# Patient Record
Sex: MALE | Race: WHITE | NOT HISPANIC OR LATINO | Employment: FULL TIME | URBAN - METROPOLITAN AREA
[De-identification: names, ages, dates, MRNs, and addresses within clinical notes are randomized per-mention and may not be internally consistent; named-entity substitution may affect disease eponyms.]

---

## 2017-05-04 ENCOUNTER — RECORDS - HEALTHEAST (OUTPATIENT)
Dept: LAB | Facility: CLINIC | Age: 47
End: 2017-05-04

## 2017-05-04 LAB
CHOLEST SERPL-MCNC: 175 MG/DL
FASTING STATUS PATIENT QL REPORTED: ABNORMAL
HDLC SERPL-MCNC: 47 MG/DL
LDLC SERPL CALC-MCNC: 85 MG/DL
TRIGL SERPL-MCNC: 213 MG/DL

## 2019-05-06 ENCOUNTER — RECORDS - HEALTHEAST (OUTPATIENT)
Dept: LAB | Facility: CLINIC | Age: 49
End: 2019-05-06

## 2019-05-06 LAB
ALBUMIN SERPL-MCNC: 4.1 G/DL (ref 3.5–5)
ALP SERPL-CCNC: 66 U/L (ref 45–120)
ALT SERPL W P-5'-P-CCNC: 38 U/L (ref 0–45)
ANION GAP SERPL CALCULATED.3IONS-SCNC: 13 MMOL/L (ref 5–18)
AST SERPL W P-5'-P-CCNC: 19 U/L (ref 0–40)
BASOPHILS # BLD AUTO: 0 THOU/UL (ref 0–0.2)
BASOPHILS NFR BLD AUTO: 0 % (ref 0–2)
BILIRUB SERPL-MCNC: 0.8 MG/DL (ref 0–1)
BUN SERPL-MCNC: 15 MG/DL (ref 8–22)
CALCIUM SERPL-MCNC: 9.8 MG/DL (ref 8.5–10.5)
CHLORIDE BLD-SCNC: 103 MMOL/L (ref 98–107)
CHOLEST SERPL-MCNC: 164 MG/DL
CO2 SERPL-SCNC: 22 MMOL/L (ref 22–31)
CREAT SERPL-MCNC: 0.84 MG/DL (ref 0.7–1.3)
EOSINOPHIL # BLD AUTO: 0.1 THOU/UL (ref 0–0.4)
EOSINOPHIL NFR BLD AUTO: 2 % (ref 0–6)
ERYTHROCYTE [DISTWIDTH] IN BLOOD BY AUTOMATED COUNT: 13.4 % (ref 11–14.5)
FASTING STATUS PATIENT QL REPORTED: ABNORMAL
GFR SERPL CREATININE-BSD FRML MDRD: >60 ML/MIN/1.73M2
GLUCOSE BLD-MCNC: 149 MG/DL (ref 70–125)
HCT VFR BLD AUTO: 37.9 % (ref 40–54)
HDLC SERPL-MCNC: 44 MG/DL
HGB BLD-MCNC: 12.7 G/DL (ref 14–18)
LDLC SERPL CALC-MCNC: 82 MG/DL
LYMPHOCYTES # BLD AUTO: 1.8 THOU/UL (ref 0.8–4.4)
LYMPHOCYTES NFR BLD AUTO: 38 % (ref 20–40)
MCH RBC QN AUTO: 29.7 PG (ref 27–34)
MCHC RBC AUTO-ENTMCNC: 33.5 G/DL (ref 32–36)
MCV RBC AUTO: 89 FL (ref 80–100)
MONOCYTES # BLD AUTO: 0.4 THOU/UL (ref 0–0.9)
MONOCYTES NFR BLD AUTO: 7 % (ref 2–10)
NEUTROPHILS # BLD AUTO: 2.5 THOU/UL (ref 2–7.7)
NEUTROPHILS NFR BLD AUTO: 52 % (ref 50–70)
PLATELET # BLD AUTO: 185 THOU/UL (ref 140–440)
PMV BLD AUTO: 11.1 FL (ref 8.5–12.5)
POTASSIUM BLD-SCNC: 4.4 MMOL/L (ref 3.5–5)
PROT SERPL-MCNC: 6.9 G/DL (ref 6–8)
RBC # BLD AUTO: 4.28 MILL/UL (ref 4.4–6.2)
SODIUM SERPL-SCNC: 138 MMOL/L (ref 136–145)
TRIGL SERPL-MCNC: 189 MG/DL
WBC: 4.8 THOU/UL (ref 4–11)

## 2019-05-09 LAB
SHBG SERPL-SCNC: 17 NMOL/L (ref 11–80)
TESTOST FREE SERPL-MCNC: 7.91 NG/DL (ref 4.7–24.4)
TESTOST SERPL-MCNC: 291 NG/DL (ref 240–950)

## 2020-03-24 ENCOUNTER — RECORDS - HEALTHEAST (OUTPATIENT)
Dept: LAB | Facility: CLINIC | Age: 50
End: 2020-03-24

## 2020-03-24 LAB
ALBUMIN SERPL-MCNC: 4 G/DL (ref 3.5–5)
ALP SERPL-CCNC: 66 U/L (ref 45–120)
ALT SERPL W P-5'-P-CCNC: 25 U/L (ref 0–45)
ANION GAP SERPL CALCULATED.3IONS-SCNC: 8 MMOL/L (ref 5–18)
AST SERPL W P-5'-P-CCNC: 15 U/L (ref 0–40)
BILIRUB SERPL-MCNC: 0.7 MG/DL (ref 0–1)
BUN SERPL-MCNC: 12 MG/DL (ref 8–22)
CALCIUM SERPL-MCNC: 9.8 MG/DL (ref 8.5–10.5)
CHLORIDE BLD-SCNC: 102 MMOL/L (ref 98–107)
CHOLEST SERPL-MCNC: 157 MG/DL
CO2 SERPL-SCNC: 30 MMOL/L (ref 22–31)
CREAT SERPL-MCNC: 0.9 MG/DL (ref 0.7–1.3)
FASTING STATUS PATIENT QL REPORTED: ABNORMAL
GFR SERPL CREATININE-BSD FRML MDRD: >60 ML/MIN/1.73M2
GLUCOSE BLD-MCNC: 138 MG/DL (ref 70–125)
HDLC SERPL-MCNC: 44 MG/DL
LDLC SERPL CALC-MCNC: 58 MG/DL
POTASSIUM BLD-SCNC: 4.4 MMOL/L (ref 3.5–5)
PROT SERPL-MCNC: 6.9 G/DL (ref 6–8)
SODIUM SERPL-SCNC: 140 MMOL/L (ref 136–145)
TRIGL SERPL-MCNC: 273 MG/DL

## 2020-05-07 ENCOUNTER — RECORDS - HEALTHEAST (OUTPATIENT)
Dept: LAB | Facility: CLINIC | Age: 50
End: 2020-05-07

## 2020-05-07 LAB
C REACTIVE PROTEIN LHE: 0.4 MG/DL (ref 0–0.8)
URATE SERPL-MCNC: 4.5 MG/DL (ref 3–8)

## 2021-03-05 ENCOUNTER — RECORDS - HEALTHEAST (OUTPATIENT)
Dept: LAB | Facility: CLINIC | Age: 51
End: 2021-03-05

## 2021-03-05 LAB
ALBUMIN SERPL-MCNC: 4 G/DL (ref 3.5–5)
ALP SERPL-CCNC: 69 U/L (ref 45–120)
ALT SERPL W P-5'-P-CCNC: 31 U/L (ref 0–45)
ANION GAP SERPL CALCULATED.3IONS-SCNC: 9 MMOL/L (ref 5–18)
AST SERPL W P-5'-P-CCNC: 19 U/L (ref 0–40)
BILIRUB SERPL-MCNC: 0.8 MG/DL (ref 0–1)
BUN SERPL-MCNC: 15 MG/DL (ref 8–22)
CALCIUM SERPL-MCNC: 9 MG/DL (ref 8.5–10.5)
CHLORIDE BLD-SCNC: 99 MMOL/L (ref 98–107)
CHOLEST SERPL-MCNC: 146 MG/DL
CO2 SERPL-SCNC: 26 MMOL/L (ref 22–31)
CREAT SERPL-MCNC: 0.96 MG/DL (ref 0.7–1.3)
FASTING STATUS PATIENT QL REPORTED: ABNORMAL
GFR SERPL CREATININE-BSD FRML MDRD: >60 ML/MIN/1.73M2
GLUCOSE BLD-MCNC: 173 MG/DL (ref 70–125)
HDLC SERPL-MCNC: 50 MG/DL
LDLC SERPL CALC-MCNC: 40 MG/DL
POTASSIUM BLD-SCNC: 4.1 MMOL/L (ref 3.5–5)
PROT SERPL-MCNC: 6.8 G/DL (ref 6–8)
SODIUM SERPL-SCNC: 134 MMOL/L (ref 136–145)
TRIGL SERPL-MCNC: 282 MG/DL

## 2021-05-27 ENCOUNTER — RECORDS - HEALTHEAST (OUTPATIENT)
Dept: ADMINISTRATIVE | Facility: CLINIC | Age: 51
End: 2021-05-27

## 2021-05-28 ENCOUNTER — RECORDS - HEALTHEAST (OUTPATIENT)
Dept: ADMINISTRATIVE | Facility: CLINIC | Age: 51
End: 2021-05-28

## 2021-06-01 ENCOUNTER — RECORDS - HEALTHEAST (OUTPATIENT)
Dept: ADMINISTRATIVE | Facility: CLINIC | Age: 51
End: 2021-06-01

## 2021-07-28 ENCOUNTER — LAB REQUISITION (OUTPATIENT)
Dept: LAB | Facility: CLINIC | Age: 51
End: 2021-07-28

## 2021-07-28 DIAGNOSIS — R10.12 LEFT UPPER QUADRANT PAIN: ICD-10-CM

## 2021-07-28 LAB — LIPASE SERPL-CCNC: 35 U/L (ref 0–52)

## 2021-07-28 PROCEDURE — 83690 ASSAY OF LIPASE: CPT | Performed by: NURSE PRACTITIONER

## 2022-02-18 ENCOUNTER — LAB REQUISITION (OUTPATIENT)
Dept: LAB | Facility: CLINIC | Age: 52
End: 2022-02-18

## 2022-02-18 DIAGNOSIS — I10 ESSENTIAL (PRIMARY) HYPERTENSION: ICD-10-CM

## 2022-02-18 DIAGNOSIS — E78.5 HYPERLIPIDEMIA, UNSPECIFIED: ICD-10-CM

## 2022-02-18 LAB
ALBUMIN SERPL-MCNC: 4.3 G/DL (ref 3.5–5)
ALP SERPL-CCNC: 59 U/L (ref 45–120)
ALT SERPL W P-5'-P-CCNC: 41 U/L (ref 0–45)
ANION GAP SERPL CALCULATED.3IONS-SCNC: 8 MMOL/L (ref 5–18)
AST SERPL W P-5'-P-CCNC: 24 U/L (ref 0–40)
BILIRUB SERPL-MCNC: 1.1 MG/DL (ref 0–1)
BUN SERPL-MCNC: 10 MG/DL (ref 8–22)
CALCIUM SERPL-MCNC: 9.6 MG/DL (ref 8.5–10.5)
CHLORIDE BLD-SCNC: 102 MMOL/L (ref 98–107)
CHOLEST SERPL-MCNC: 163 MG/DL
CO2 SERPL-SCNC: 27 MMOL/L (ref 22–31)
CREAT SERPL-MCNC: 0.96 MG/DL (ref 0.7–1.3)
FASTING STATUS PATIENT QL REPORTED: ABNORMAL
GFR SERPL CREATININE-BSD FRML MDRD: >90 ML/MIN/1.73M2
GLUCOSE BLD-MCNC: 160 MG/DL (ref 70–125)
HDLC SERPL-MCNC: 51 MG/DL
LDLC SERPL CALC-MCNC: 69 MG/DL
POTASSIUM BLD-SCNC: 3.7 MMOL/L (ref 3.5–5)
PROT SERPL-MCNC: 7.2 G/DL (ref 6–8)
SODIUM SERPL-SCNC: 137 MMOL/L (ref 136–145)
TRIGL SERPL-MCNC: 214 MG/DL

## 2022-02-18 PROCEDURE — 80061 LIPID PANEL: CPT | Performed by: FAMILY MEDICINE

## 2022-02-18 PROCEDURE — 80053 COMPREHEN METABOLIC PANEL: CPT | Performed by: FAMILY MEDICINE

## 2022-06-08 ENCOUNTER — TRANSFERRED RECORDS (OUTPATIENT)
Dept: HEALTH INFORMATION MANAGEMENT | Facility: CLINIC | Age: 52
End: 2022-06-08

## 2022-08-10 ENCOUNTER — TRANSFERRED RECORDS (OUTPATIENT)
Dept: HEALTH INFORMATION MANAGEMENT | Facility: CLINIC | Age: 52
End: 2022-08-10

## 2022-08-15 ENCOUNTER — LAB REQUISITION (OUTPATIENT)
Dept: LAB | Facility: CLINIC | Age: 52
End: 2022-08-15

## 2022-08-15 DIAGNOSIS — Z00.00 ENCOUNTER FOR GENERAL ADULT MEDICAL EXAMINATION WITHOUT ABNORMAL FINDINGS: ICD-10-CM

## 2022-08-15 DIAGNOSIS — E78.5 HYPERLIPIDEMIA, UNSPECIFIED: ICD-10-CM

## 2022-08-15 DIAGNOSIS — E11.65 TYPE 2 DIABETES MELLITUS WITH HYPERGLYCEMIA (H): ICD-10-CM

## 2022-08-15 DIAGNOSIS — I10 ESSENTIAL (PRIMARY) HYPERTENSION: ICD-10-CM

## 2022-08-15 LAB
ALBUMIN SERPL BCG-MCNC: 4.5 G/DL (ref 3.5–5.2)
ALP SERPL-CCNC: 59 U/L (ref 40–129)
ALT SERPL W P-5'-P-CCNC: 30 U/L (ref 10–50)
ANION GAP SERPL CALCULATED.3IONS-SCNC: 12 MMOL/L (ref 7–15)
AST SERPL W P-5'-P-CCNC: 25 U/L (ref 10–50)
BILIRUB SERPL-MCNC: 0.6 MG/DL
BUN SERPL-MCNC: 8.7 MG/DL (ref 6–20)
CALCIUM SERPL-MCNC: 9.4 MG/DL (ref 8.6–10)
CHLORIDE SERPL-SCNC: 97 MMOL/L (ref 98–107)
CHOLEST SERPL-MCNC: 161 MG/DL
CREAT SERPL-MCNC: 0.95 MG/DL (ref 0.67–1.17)
CREAT UR-MCNC: 39.1 MG/DL
DEPRECATED HCO3 PLAS-SCNC: 28 MMOL/L (ref 22–29)
GFR SERPL CREATININE-BSD FRML MDRD: >90 ML/MIN/1.73M2
GLUCOSE SERPL-MCNC: 136 MG/DL (ref 70–99)
HDLC SERPL-MCNC: 51 MG/DL
LDLC SERPL CALC-MCNC: 72 MG/DL
MICROALBUMIN UR-MCNC: <12 MG/L
MICROALBUMIN/CREAT UR: NORMAL MG/G{CREAT}
NONHDLC SERPL-MCNC: 110 MG/DL
POTASSIUM SERPL-SCNC: 3.9 MMOL/L (ref 3.4–5.3)
PROT SERPL-MCNC: 6.6 G/DL (ref 6.4–8.3)
PSA SERPL-MCNC: 0.89 NG/ML (ref 0–3.5)
SODIUM SERPL-SCNC: 137 MMOL/L (ref 136–145)
TRIGL SERPL-MCNC: 189 MG/DL

## 2022-08-15 PROCEDURE — 80061 LIPID PANEL: CPT | Performed by: STUDENT IN AN ORGANIZED HEALTH CARE EDUCATION/TRAINING PROGRAM

## 2022-08-15 PROCEDURE — 82043 UR ALBUMIN QUANTITATIVE: CPT | Performed by: STUDENT IN AN ORGANIZED HEALTH CARE EDUCATION/TRAINING PROGRAM

## 2022-08-15 PROCEDURE — G0103 PSA SCREENING: HCPCS | Performed by: STUDENT IN AN ORGANIZED HEALTH CARE EDUCATION/TRAINING PROGRAM

## 2022-08-15 PROCEDURE — 80053 COMPREHEN METABOLIC PANEL: CPT | Performed by: STUDENT IN AN ORGANIZED HEALTH CARE EDUCATION/TRAINING PROGRAM

## 2022-08-23 ENCOUNTER — TRANSFERRED RECORDS (OUTPATIENT)
Dept: HEALTH INFORMATION MANAGEMENT | Facility: CLINIC | Age: 52
End: 2022-08-23

## 2022-09-23 ENCOUNTER — TRANSFERRED RECORDS (OUTPATIENT)
Dept: HEALTH INFORMATION MANAGEMENT | Facility: CLINIC | Age: 52
End: 2022-09-23

## 2023-02-06 ENCOUNTER — TRANSFERRED RECORDS (OUTPATIENT)
Dept: HEALTH INFORMATION MANAGEMENT | Facility: CLINIC | Age: 53
End: 2023-02-06

## 2023-03-17 ENCOUNTER — LAB REQUISITION (OUTPATIENT)
Dept: LAB | Facility: CLINIC | Age: 53
End: 2023-03-17

## 2023-03-17 DIAGNOSIS — I10 ESSENTIAL (PRIMARY) HYPERTENSION: ICD-10-CM

## 2023-03-17 DIAGNOSIS — E11.42 TYPE 2 DIABETES MELLITUS WITH DIABETIC POLYNEUROPATHY (H): ICD-10-CM

## 2023-03-17 DIAGNOSIS — E78.5 HYPERLIPIDEMIA, UNSPECIFIED: ICD-10-CM

## 2023-03-17 LAB
CHOLEST SERPL-MCNC: 142 MG/DL
HDLC SERPL-MCNC: 41 MG/DL
LDLC SERPL CALC-MCNC: 58 MG/DL
NONHDLC SERPL-MCNC: 101 MG/DL
TRIGL SERPL-MCNC: 213 MG/DL

## 2023-03-17 PROCEDURE — 80061 LIPID PANEL: CPT | Performed by: STUDENT IN AN ORGANIZED HEALTH CARE EDUCATION/TRAINING PROGRAM

## 2023-03-17 PROCEDURE — 80053 COMPREHEN METABOLIC PANEL: CPT | Performed by: STUDENT IN AN ORGANIZED HEALTH CARE EDUCATION/TRAINING PROGRAM

## 2023-03-18 LAB
ALBUMIN SERPL BCG-MCNC: 4.4 G/DL (ref 3.5–5.2)
ALP SERPL-CCNC: 58 U/L (ref 40–129)
ALT SERPL W P-5'-P-CCNC: 23 U/L (ref 10–50)
ANION GAP SERPL CALCULATED.3IONS-SCNC: 11 MMOL/L (ref 7–15)
AST SERPL W P-5'-P-CCNC: 17 U/L (ref 10–50)
BILIRUB SERPL-MCNC: 0.6 MG/DL
BUN SERPL-MCNC: 17 MG/DL (ref 6–20)
CALCIUM SERPL-MCNC: 9.6 MG/DL (ref 8.6–10)
CHLORIDE SERPL-SCNC: 100 MMOL/L (ref 98–107)
CREAT SERPL-MCNC: 0.91 MG/DL (ref 0.67–1.17)
DEPRECATED HCO3 PLAS-SCNC: 28 MMOL/L (ref 22–29)
GFR SERPL CREATININE-BSD FRML MDRD: >90 ML/MIN/1.73M2
GLUCOSE SERPL-MCNC: 120 MG/DL (ref 70–99)
POTASSIUM SERPL-SCNC: 4.4 MMOL/L (ref 3.4–5.3)
PROT SERPL-MCNC: 6.9 G/DL (ref 6.4–8.3)
SODIUM SERPL-SCNC: 139 MMOL/L (ref 136–145)

## 2023-03-22 ENCOUNTER — TRANSFERRED RECORDS (OUTPATIENT)
Dept: HEALTH INFORMATION MANAGEMENT | Facility: CLINIC | Age: 53
End: 2023-03-22

## 2023-04-14 ENCOUNTER — LAB REQUISITION (OUTPATIENT)
Dept: LAB | Facility: CLINIC | Age: 53
End: 2023-04-14

## 2023-04-14 DIAGNOSIS — Z01.818 ENCOUNTER FOR OTHER PREPROCEDURAL EXAMINATION: ICD-10-CM

## 2023-04-14 LAB
ANION GAP SERPL CALCULATED.3IONS-SCNC: 17 MMOL/L (ref 7–15)
BUN SERPL-MCNC: 16.9 MG/DL (ref 6–20)
CALCIUM SERPL-MCNC: 10 MG/DL (ref 8.6–10)
CHLORIDE SERPL-SCNC: 101 MMOL/L (ref 98–107)
CREAT SERPL-MCNC: 1.25 MG/DL (ref 0.67–1.17)
DEPRECATED HCO3 PLAS-SCNC: 25 MMOL/L (ref 22–29)
GFR SERPL CREATININE-BSD FRML MDRD: 68 ML/MIN/1.73M2
GLUCOSE SERPL-MCNC: 115 MG/DL (ref 70–99)
POTASSIUM SERPL-SCNC: 4 MMOL/L (ref 3.4–5.3)
SODIUM SERPL-SCNC: 143 MMOL/L (ref 136–145)

## 2023-04-14 PROCEDURE — 80048 BASIC METABOLIC PNL TOTAL CA: CPT | Performed by: STUDENT IN AN ORGANIZED HEALTH CARE EDUCATION/TRAINING PROGRAM

## 2023-04-25 ENCOUNTER — TRANSFERRED RECORDS (OUTPATIENT)
Dept: HEALTH INFORMATION MANAGEMENT | Facility: CLINIC | Age: 53
End: 2023-04-25
Payer: COMMERCIAL

## 2023-05-04 ENCOUNTER — TRANSFERRED RECORDS (OUTPATIENT)
Dept: HEALTH INFORMATION MANAGEMENT | Facility: CLINIC | Age: 53
End: 2023-05-04
Payer: COMMERCIAL

## 2023-05-10 ENCOUNTER — TRANSFERRED RECORDS (OUTPATIENT)
Dept: HEALTH INFORMATION MANAGEMENT | Facility: CLINIC | Age: 53
End: 2023-05-10
Payer: COMMERCIAL

## 2023-09-01 ENCOUNTER — LAB REQUISITION (OUTPATIENT)
Dept: LAB | Facility: CLINIC | Age: 53
End: 2023-09-01

## 2023-09-01 DIAGNOSIS — E78.5 HYPERLIPIDEMIA, UNSPECIFIED: ICD-10-CM

## 2023-09-01 DIAGNOSIS — I10 ESSENTIAL (PRIMARY) HYPERTENSION: ICD-10-CM

## 2023-09-01 DIAGNOSIS — Z00.00 ENCOUNTER FOR GENERAL ADULT MEDICAL EXAMINATION WITHOUT ABNORMAL FINDINGS: ICD-10-CM

## 2023-09-01 DIAGNOSIS — E11.42 TYPE 2 DIABETES MELLITUS WITH DIABETIC POLYNEUROPATHY (H): ICD-10-CM

## 2023-09-01 LAB
ALBUMIN SERPL BCG-MCNC: 4.7 G/DL (ref 3.5–5.2)
ALP SERPL-CCNC: 62 U/L (ref 40–129)
ALT SERPL W P-5'-P-CCNC: 25 U/L (ref 0–70)
ANION GAP SERPL CALCULATED.3IONS-SCNC: 12 MMOL/L (ref 7–15)
AST SERPL W P-5'-P-CCNC: 18 U/L (ref 0–45)
BILIRUB SERPL-MCNC: 0.5 MG/DL
BUN SERPL-MCNC: 16.4 MG/DL (ref 6–20)
CALCIUM SERPL-MCNC: 9.3 MG/DL (ref 8.6–10)
CHLORIDE SERPL-SCNC: 100 MMOL/L (ref 98–107)
CHOLEST SERPL-MCNC: 159 MG/DL
CREAT SERPL-MCNC: 0.93 MG/DL (ref 0.67–1.17)
DEPRECATED HCO3 PLAS-SCNC: 28 MMOL/L (ref 22–29)
GFR SERPL CREATININE-BSD FRML MDRD: >90 ML/MIN/1.73M2
GLUCOSE SERPL-MCNC: 81 MG/DL (ref 70–99)
HDLC SERPL-MCNC: 54 MG/DL
LDLC SERPL CALC-MCNC: 64 MG/DL
NONHDLC SERPL-MCNC: 105 MG/DL
POTASSIUM SERPL-SCNC: 4 MMOL/L (ref 3.4–5.3)
PROT SERPL-MCNC: 6.9 G/DL (ref 6.4–8.3)
PSA SERPL DL<=0.01 NG/ML-MCNC: 1 NG/ML (ref 0–3.5)
SODIUM SERPL-SCNC: 140 MMOL/L (ref 136–145)
TRIGL SERPL-MCNC: 203 MG/DL

## 2023-09-01 PROCEDURE — 80061 LIPID PANEL: CPT | Performed by: STUDENT IN AN ORGANIZED HEALTH CARE EDUCATION/TRAINING PROGRAM

## 2023-09-01 PROCEDURE — 80053 COMPREHEN METABOLIC PANEL: CPT | Performed by: STUDENT IN AN ORGANIZED HEALTH CARE EDUCATION/TRAINING PROGRAM

## 2023-09-01 PROCEDURE — G0103 PSA SCREENING: HCPCS | Performed by: STUDENT IN AN ORGANIZED HEALTH CARE EDUCATION/TRAINING PROGRAM

## 2024-02-11 ENCOUNTER — APPOINTMENT (OUTPATIENT)
Dept: RADIOLOGY | Facility: CLINIC | Age: 54
DRG: 514 | End: 2024-02-11
Attending: INTERNAL MEDICINE
Payer: COMMERCIAL

## 2024-02-11 ENCOUNTER — HOSPITAL ENCOUNTER (INPATIENT)
Facility: CLINIC | Age: 54
LOS: 3 days | Discharge: HOME | DRG: 514 | End: 2024-02-15
Attending: EMERGENCY MEDICINE | Admitting: INTERNAL MEDICINE
Payer: COMMERCIAL

## 2024-02-11 DIAGNOSIS — S61.012A LACERATION OF LEFT THUMB WITH INFECTION, INITIAL ENCOUNTER: ICD-10-CM

## 2024-02-11 DIAGNOSIS — L08.9 LACERATION OF LEFT THUMB WITH INFECTION, INITIAL ENCOUNTER: ICD-10-CM

## 2024-02-11 LAB
ALBUMIN SERPL BCG-MCNC: 4.6 G/DL (ref 3.5–5.2)
ALP SERPL-CCNC: 58 U/L (ref 40–150)
ALT SERPL W P-5'-P-CCNC: 20 U/L (ref 0–70)
ANION GAP SERPL CALCULATED.3IONS-SCNC: 13 MMOL/L (ref 7–15)
AST SERPL W P-5'-P-CCNC: 19 U/L (ref 0–45)
ATRIAL RATE - MUSE: 102 BPM
BILIRUB SERPL-MCNC: 0.7 MG/DL
BUN SERPL-MCNC: 11 MG/DL (ref 6–20)
CALCIUM SERPL-MCNC: 10.1 MG/DL (ref 8.6–10)
CHLORIDE SERPL-SCNC: 101 MMOL/L (ref 98–107)
CREAT SERPL-MCNC: 0.76 MG/DL (ref 0.67–1.17)
CRP SERPL-MCNC: 79.3 MG/L
DEPRECATED HCO3 PLAS-SCNC: 24 MMOL/L (ref 22–29)
DIASTOLIC BLOOD PRESSURE - MUSE: 85 MMHG
EGFRCR SERPLBLD CKD-EPI 2021: >90 ML/MIN/1.73M2
ERYTHROCYTE [DISTWIDTH] IN BLOOD BY AUTOMATED COUNT: 13.4 % (ref 10–15)
GLUCOSE BLDC GLUCOMTR-MCNC: 117 MG/DL (ref 70–99)
GLUCOSE SERPL-MCNC: 118 MG/DL (ref 70–99)
HCT VFR BLD AUTO: 35.7 % (ref 40–53)
HGB BLD-MCNC: 12.6 G/DL (ref 13.3–17.7)
INTERPRETATION ECG - MUSE: NORMAL
MAGNESIUM SERPL-MCNC: 1.9 MG/DL (ref 1.7–2.3)
MCH RBC QN AUTO: 31 PG (ref 26.5–33)
MCHC RBC AUTO-ENTMCNC: 35.3 G/DL (ref 31.5–36.5)
MCV RBC AUTO: 88 FL (ref 78–100)
P AXIS - MUSE: 105 DEGREES
PLATELET # BLD AUTO: 189 10E3/UL (ref 150–450)
POTASSIUM SERPL-SCNC: 4 MMOL/L (ref 3.4–5.3)
PR INTERVAL - MUSE: 186 MS
PROCALCITONIN SERPL IA-MCNC: 0.06 NG/ML
PROT SERPL-MCNC: 7.4 G/DL (ref 6.4–8.3)
QRS DURATION - MUSE: 100 MS
QT - MUSE: 338 MS
QTC - MUSE: 440 MS
R AXIS - MUSE: 155 DEGREES
RBC # BLD AUTO: 4.06 10E6/UL (ref 4.4–5.9)
SODIUM SERPL-SCNC: 138 MMOL/L (ref 135–145)
SYSTOLIC BLOOD PRESSURE - MUSE: 155 MMHG
T AXIS - MUSE: 161 DEGREES
VENTRICULAR RATE- MUSE: 102 BPM
WBC # BLD AUTO: 9.9 10E3/UL (ref 4–11)

## 2024-02-11 PROCEDURE — 250N000013 HC RX MED GY IP 250 OP 250 PS 637: Performed by: INTERNAL MEDICINE

## 2024-02-11 PROCEDURE — 87640 STAPH A DNA AMP PROBE: CPT | Performed by: INTERNAL MEDICINE

## 2024-02-11 PROCEDURE — 96375 TX/PRO/DX INJ NEW DRUG ADDON: CPT

## 2024-02-11 PROCEDURE — 96361 HYDRATE IV INFUSION ADD-ON: CPT

## 2024-02-11 PROCEDURE — 250N000011 HC RX IP 250 OP 636: Performed by: INTERNAL MEDICINE

## 2024-02-11 PROCEDURE — 80053 COMPREHEN METABOLIC PANEL: CPT | Performed by: INTERNAL MEDICINE

## 2024-02-11 PROCEDURE — G0378 HOSPITAL OBSERVATION PER HR: HCPCS

## 2024-02-11 PROCEDURE — 99285 EMERGENCY DEPT VISIT HI MDM: CPT | Mod: 25

## 2024-02-11 PROCEDURE — 96365 THER/PROPH/DIAG IV INF INIT: CPT

## 2024-02-11 PROCEDURE — 36415 COLL VENOUS BLD VENIPUNCTURE: CPT | Performed by: EMERGENCY MEDICINE

## 2024-02-11 PROCEDURE — 83735 ASSAY OF MAGNESIUM: CPT | Performed by: INTERNAL MEDICINE

## 2024-02-11 PROCEDURE — 87641 MR-STAPH DNA AMP PROBE: CPT | Performed by: INTERNAL MEDICINE

## 2024-02-11 PROCEDURE — 258N000003 HC RX IP 258 OP 636: Performed by: INTERNAL MEDICINE

## 2024-02-11 PROCEDURE — 73140 X-RAY EXAM OF FINGER(S): CPT | Mod: LT

## 2024-02-11 PROCEDURE — 85027 COMPLETE CBC AUTOMATED: CPT | Performed by: EMERGENCY MEDICINE

## 2024-02-11 PROCEDURE — 93005 ELECTROCARDIOGRAM TRACING: CPT | Performed by: INTERNAL MEDICINE

## 2024-02-11 PROCEDURE — 84145 PROCALCITONIN (PCT): CPT | Performed by: INTERNAL MEDICINE

## 2024-02-11 PROCEDURE — 82962 GLUCOSE BLOOD TEST: CPT

## 2024-02-11 PROCEDURE — 250N000011 HC RX IP 250 OP 636: Mod: JZ | Performed by: EMERGENCY MEDICINE

## 2024-02-11 PROCEDURE — 86140 C-REACTIVE PROTEIN: CPT | Performed by: EMERGENCY MEDICINE

## 2024-02-11 PROCEDURE — 99223 1ST HOSP IP/OBS HIGH 75: CPT | Performed by: INTERNAL MEDICINE

## 2024-02-11 RX ORDER — CEPHALEXIN 500 MG/1
500 CAPSULE ORAL 3 TIMES DAILY
Status: ON HOLD | COMMUNITY
Start: 2024-02-10 | End: 2024-02-15

## 2024-02-11 RX ORDER — SODIUM CHLORIDE 9 MG/ML
INJECTION, SOLUTION INTRAVENOUS CONTINUOUS
Status: DISCONTINUED | OUTPATIENT
Start: 2024-02-11 | End: 2024-02-14

## 2024-02-11 RX ORDER — VITAMIN E 268 MG
1 CAPSULE ORAL DAILY
Status: ON HOLD | COMMUNITY
End: 2024-02-15

## 2024-02-11 RX ORDER — ACETAMINOPHEN 500 MG
500-1000 TABLET ORAL EVERY 6 HOURS PRN
COMMUNITY

## 2024-02-11 RX ORDER — FLUTICASONE PROPIONATE 50 MCG
2 SPRAY, SUSPENSION (ML) NASAL DAILY
COMMUNITY
Start: 2023-08-10

## 2024-02-11 RX ORDER — FOLIC ACID 1 MG/1
1 TABLET ORAL DAILY
Status: DISCONTINUED | OUTPATIENT
Start: 2024-02-12 | End: 2024-02-15 | Stop reason: HOSPADM

## 2024-02-11 RX ORDER — LOSARTAN POTASSIUM 50 MG/1
100 TABLET ORAL DAILY
Status: DISCONTINUED | OUTPATIENT
Start: 2024-02-12 | End: 2024-02-15 | Stop reason: HOSPADM

## 2024-02-11 RX ORDER — MONTELUKAST SODIUM 10 MG/1
10 TABLET ORAL AT BEDTIME
COMMUNITY
Start: 2023-08-10

## 2024-02-11 RX ORDER — LANOLIN ALCOHOL/MO/W.PET/CERES
1000 CREAM (GRAM) TOPICAL DAILY
Status: DISCONTINUED | OUTPATIENT
Start: 2024-02-12 | End: 2024-02-15 | Stop reason: HOSPADM

## 2024-02-11 RX ORDER — ONDANSETRON 4 MG/1
4 TABLET, ORALLY DISINTEGRATING ORAL EVERY 6 HOURS PRN
Status: DISCONTINUED | OUTPATIENT
Start: 2024-02-11 | End: 2024-02-15 | Stop reason: HOSPADM

## 2024-02-11 RX ORDER — ACETAMINOPHEN 325 MG/1
650 TABLET ORAL EVERY 4 HOURS PRN
Status: DISCONTINUED | OUTPATIENT
Start: 2024-02-11 | End: 2024-02-15 | Stop reason: HOSPADM

## 2024-02-11 RX ORDER — OMEPRAZOLE 40 MG/1
40 CAPSULE, DELAYED RELEASE ORAL
COMMUNITY
Start: 2023-08-10

## 2024-02-11 RX ORDER — ONDANSETRON 2 MG/ML
4 INJECTION INTRAMUSCULAR; INTRAVENOUS EVERY 6 HOURS PRN
Status: DISCONTINUED | OUTPATIENT
Start: 2024-02-11 | End: 2024-02-15 | Stop reason: HOSPADM

## 2024-02-11 RX ORDER — FEXOFENADINE HCL 60 MG/1
60 TABLET, FILM COATED ORAL 2 TIMES DAILY
Status: DISCONTINUED | OUTPATIENT
Start: 2024-02-12 | End: 2024-02-15 | Stop reason: HOSPADM

## 2024-02-11 RX ORDER — DULAGLUTIDE 0.75 MG/.5ML
0.75 INJECTION, SOLUTION SUBCUTANEOUS
COMMUNITY
Start: 2023-09-07

## 2024-02-11 RX ORDER — MAGNESIUM 200 MG
1 TABLET ORAL DAILY
COMMUNITY

## 2024-02-11 RX ORDER — CLINDAMYCIN PHOSPHATE 900 MG/50ML
900 INJECTION, SOLUTION INTRAVENOUS ONCE
Status: COMPLETED | OUTPATIENT
Start: 2024-02-11 | End: 2024-02-11

## 2024-02-11 RX ORDER — FLUTICASONE PROPIONATE 50 MCG
2 SPRAY, SUSPENSION (ML) NASAL DAILY
Status: DISCONTINUED | OUTPATIENT
Start: 2024-02-12 | End: 2024-02-15 | Stop reason: HOSPADM

## 2024-02-11 RX ORDER — CLINDAMYCIN PHOSPHATE 900 MG/50ML
900 INJECTION, SOLUTION INTRAVENOUS EVERY 8 HOURS
Status: DISCONTINUED | OUTPATIENT
Start: 2024-02-12 | End: 2024-02-12

## 2024-02-11 RX ORDER — PRAZOSIN HYDROCHLORIDE 2 MG/1
2 CAPSULE ORAL 3 TIMES DAILY
COMMUNITY
Start: 2023-08-10

## 2024-02-11 RX ORDER — AMOXICILLIN 250 MG
2 CAPSULE ORAL 2 TIMES DAILY PRN
Status: DISCONTINUED | OUTPATIENT
Start: 2024-02-11 | End: 2024-02-15 | Stop reason: HOSPADM

## 2024-02-11 RX ORDER — PRAZOSIN HYDROCHLORIDE 2 MG/1
2 CAPSULE ORAL 3 TIMES DAILY
Status: DISCONTINUED | OUTPATIENT
Start: 2024-02-12 | End: 2024-02-15 | Stop reason: HOSPADM

## 2024-02-11 RX ORDER — HYDROCODONE BITARTRATE AND ACETAMINOPHEN 5; 325 MG/1; MG/1
1-2 TABLET ORAL EVERY 6 HOURS PRN
Status: DISCONTINUED | OUTPATIENT
Start: 2024-02-11 | End: 2024-02-15 | Stop reason: HOSPADM

## 2024-02-11 RX ORDER — MULTIVITAMIN,THERAPEUTIC
1 TABLET ORAL DAILY
Status: DISCONTINUED | OUTPATIENT
Start: 2024-02-12 | End: 2024-02-15 | Stop reason: HOSPADM

## 2024-02-11 RX ORDER — FOLIC ACID 1 MG/1
1 TABLET ORAL DAILY
COMMUNITY

## 2024-02-11 RX ORDER — AMLODIPINE AND OLMESARTAN MEDOXOMIL 10; 40 MG/1; MG/1
1 TABLET ORAL DAILY
COMMUNITY
Start: 2023-08-10

## 2024-02-11 RX ORDER — TESTOSTERONE ENANTHATE 100 MG/.5ML
100 INJECTION SUBCUTANEOUS WEEKLY
COMMUNITY
Start: 2023-11-08

## 2024-02-11 RX ORDER — ATORVASTATIN CALCIUM 40 MG/1
80 TABLET, FILM COATED ORAL AT BEDTIME
Status: DISCONTINUED | OUTPATIENT
Start: 2024-02-12 | End: 2024-02-15 | Stop reason: HOSPADM

## 2024-02-11 RX ORDER — AMOXICILLIN 250 MG
1 CAPSULE ORAL 2 TIMES DAILY PRN
Status: DISCONTINUED | OUTPATIENT
Start: 2024-02-11 | End: 2024-02-15 | Stop reason: HOSPADM

## 2024-02-11 RX ORDER — LANOLIN ALCOHOL/MO/W.PET/CERES
1000 CREAM (GRAM) TOPICAL DAILY
COMMUNITY

## 2024-02-11 RX ORDER — FERROUS SULFATE 220 (44)/5
90 ELIXIR ORAL DAILY
Status: DISCONTINUED | OUTPATIENT
Start: 2024-02-12 | End: 2024-02-15 | Stop reason: HOSPADM

## 2024-02-11 RX ORDER — HYDROCHLOROTHIAZIDE 12.5 MG/1
12.5 CAPSULE ORAL DAILY
Status: DISCONTINUED | OUTPATIENT
Start: 2024-02-12 | End: 2024-02-15 | Stop reason: HOSPADM

## 2024-02-11 RX ORDER — HYDROCHLOROTHIAZIDE 12.5 MG/1
1 CAPSULE ORAL DAILY
COMMUNITY
Start: 2023-08-10

## 2024-02-11 RX ORDER — ASPIRIN 81 MG/1
81 TABLET ORAL DAILY
Status: DISCONTINUED | OUTPATIENT
Start: 2024-02-12 | End: 2024-02-15 | Stop reason: HOSPADM

## 2024-02-11 RX ORDER — AMLODIPINE AND OLMESARTAN MEDOXOMIL 10; 40 MG/1; MG/1
1 TABLET ORAL DAILY
Status: DISCONTINUED | OUTPATIENT
Start: 2024-02-12 | End: 2024-02-11

## 2024-02-11 RX ORDER — ASPIRIN 81 MG/1
81 TABLET ORAL DAILY
COMMUNITY

## 2024-02-11 RX ORDER — SITAGLIPTIN 25 MG/1
1 TABLET, FILM COATED ORAL DAILY
COMMUNITY

## 2024-02-11 RX ORDER — DEXTROSE MONOHYDRATE 25 G/50ML
25-50 INJECTION, SOLUTION INTRAVENOUS
Status: DISCONTINUED | OUTPATIENT
Start: 2024-02-11 | End: 2024-02-15 | Stop reason: HOSPADM

## 2024-02-11 RX ORDER — LEVOFLOXACIN 5 MG/ML
750 INJECTION, SOLUTION INTRAVENOUS EVERY 24 HOURS
Status: DISCONTINUED | OUTPATIENT
Start: 2024-02-11 | End: 2024-02-12

## 2024-02-11 RX ORDER — CALCIUM CARBONATE/VITAMIN D3 600 MG-10
1 TABLET ORAL DAILY
COMMUNITY

## 2024-02-11 RX ORDER — METOPROLOL TARTRATE 25 MG/1
25 TABLET, FILM COATED ORAL 2 TIMES DAILY
COMMUNITY
Start: 2023-08-10

## 2024-02-11 RX ORDER — ATORVASTATIN CALCIUM 80 MG/1
80 TABLET, FILM COATED ORAL AT BEDTIME
COMMUNITY
Start: 2023-08-10

## 2024-02-11 RX ORDER — AMLODIPINE BESYLATE 10 MG/1
10 TABLET ORAL DAILY
Status: DISCONTINUED | OUTPATIENT
Start: 2024-02-12 | End: 2024-02-15 | Stop reason: HOSPADM

## 2024-02-11 RX ORDER — FEXOFENADINE HCL 60 MG/1
60 TABLET, FILM COATED ORAL 2 TIMES DAILY
COMMUNITY
Start: 2023-08-10

## 2024-02-11 RX ORDER — VENLAFAXINE HYDROCHLORIDE 150 MG/1
150 CAPSULE, EXTENDED RELEASE ORAL DAILY
COMMUNITY
Start: 2023-08-10

## 2024-02-11 RX ORDER — PANTOPRAZOLE SODIUM 40 MG/1
40 TABLET, DELAYED RELEASE ORAL
Status: DISCONTINUED | OUTPATIENT
Start: 2024-02-12 | End: 2024-02-15 | Stop reason: HOSPADM

## 2024-02-11 RX ORDER — MONTELUKAST SODIUM 10 MG/1
10 TABLET ORAL AT BEDTIME
Status: DISCONTINUED | OUTPATIENT
Start: 2024-02-12 | End: 2024-02-15 | Stop reason: HOSPADM

## 2024-02-11 RX ORDER — METOPROLOL TARTRATE 25 MG/1
25 TABLET, FILM COATED ORAL 2 TIMES DAILY
Status: DISCONTINUED | OUTPATIENT
Start: 2024-02-12 | End: 2024-02-15 | Stop reason: HOSPADM

## 2024-02-11 RX ORDER — NICOTINE POLACRILEX 4 MG
15-30 LOZENGE BUCCAL
Status: DISCONTINUED | OUTPATIENT
Start: 2024-02-11 | End: 2024-02-15 | Stop reason: HOSPADM

## 2024-02-11 RX ORDER — VENLAFAXINE HYDROCHLORIDE 150 MG/1
150 CAPSULE, EXTENDED RELEASE ORAL DAILY
Status: DISCONTINUED | OUTPATIENT
Start: 2024-02-12 | End: 2024-02-15 | Stop reason: HOSPADM

## 2024-02-11 RX ADMIN — CLINDAMYCIN IN 5 PERCENT DEXTROSE 900 MG: 18 INJECTION, SOLUTION INTRAVENOUS at 21:16

## 2024-02-11 RX ADMIN — ACETAMINOPHEN 650 MG: 325 TABLET ORAL at 22:45

## 2024-02-11 RX ADMIN — SODIUM CHLORIDE: 9 INJECTION, SOLUTION INTRAVENOUS at 23:08

## 2024-02-11 RX ADMIN — LEVOFLOXACIN 750 MG: 750 INJECTION, SOLUTION INTRAVENOUS at 23:11

## 2024-02-11 RX ADMIN — HYDROCODONE BITARTRATE AND ACETAMINOPHEN 2 TABLET: 5; 325 TABLET ORAL at 22:44

## 2024-02-11 ASSESSMENT — ACTIVITIES OF DAILY LIVING (ADL)
ADLS_ACUITY_SCORE: 35
ADLS_ACUITY_SCORE: 35

## 2024-02-12 ENCOUNTER — ANESTHESIA EVENT (OUTPATIENT)
Dept: SURGERY | Facility: CLINIC | Age: 54
DRG: 514 | End: 2024-02-12
Payer: COMMERCIAL

## 2024-02-12 ENCOUNTER — ANESTHESIA (OUTPATIENT)
Dept: SURGERY | Facility: CLINIC | Age: 54
DRG: 514 | End: 2024-02-12
Payer: COMMERCIAL

## 2024-02-12 ENCOUNTER — HOME INFUSION (PRE-WILLOW HOME INFUSION) (OUTPATIENT)
Dept: PHARMACY | Facility: CLINIC | Age: 54
End: 2024-02-12
Payer: COMMERCIAL

## 2024-02-12 LAB
ALBUMIN SERPL BCG-MCNC: 4.1 G/DL (ref 3.5–5.2)
ALP SERPL-CCNC: 52 U/L (ref 40–150)
ALT SERPL W P-5'-P-CCNC: 20 U/L (ref 0–70)
ANION GAP SERPL CALCULATED.3IONS-SCNC: 9 MMOL/L (ref 7–15)
AST SERPL W P-5'-P-CCNC: 19 U/L (ref 0–45)
BASOPHILS # BLD AUTO: 0 10E3/UL (ref 0–0.2)
BASOPHILS NFR BLD AUTO: 0 %
BILIRUB SERPL-MCNC: 0.6 MG/DL
BUN SERPL-MCNC: 11.1 MG/DL (ref 6–20)
CALCIUM SERPL-MCNC: 9.1 MG/DL (ref 8.6–10)
CHLORIDE SERPL-SCNC: 104 MMOL/L (ref 98–107)
CK SERPL-CCNC: 94 U/L (ref 39–308)
CREAT SERPL-MCNC: 0.74 MG/DL (ref 0.67–1.17)
CRP SERPL-MCNC: 93.2 MG/L
DEPRECATED HCO3 PLAS-SCNC: 26 MMOL/L (ref 22–29)
EGFRCR SERPLBLD CKD-EPI 2021: >90 ML/MIN/1.73M2
EOSINOPHIL # BLD AUTO: 0.1 10E3/UL (ref 0–0.7)
EOSINOPHIL NFR BLD AUTO: 2 %
ERYTHROCYTE [DISTWIDTH] IN BLOOD BY AUTOMATED COUNT: 13.4 % (ref 10–15)
GLUCOSE BLDC GLUCOMTR-MCNC: 123 MG/DL (ref 70–99)
GLUCOSE BLDC GLUCOMTR-MCNC: 126 MG/DL (ref 70–99)
GLUCOSE BLDC GLUCOMTR-MCNC: 129 MG/DL (ref 70–99)
GLUCOSE BLDC GLUCOMTR-MCNC: 218 MG/DL (ref 70–99)
GLUCOSE SERPL-MCNC: 122 MG/DL (ref 70–99)
GRAM STAIN RESULT: NORMAL
HCT VFR BLD AUTO: 33.8 % (ref 40–53)
HGB BLD-MCNC: 11.6 G/DL (ref 13.3–17.7)
IMM GRANULOCYTES # BLD: 0 10E3/UL
IMM GRANULOCYTES NFR BLD: 0 %
LYMPHOCYTES # BLD AUTO: 1.9 10E3/UL (ref 0.8–5.3)
LYMPHOCYTES NFR BLD AUTO: 23 %
MCH RBC QN AUTO: 31.3 PG (ref 26.5–33)
MCHC RBC AUTO-ENTMCNC: 34.3 G/DL (ref 31.5–36.5)
MCV RBC AUTO: 91 FL (ref 78–100)
MONOCYTES # BLD AUTO: 0.8 10E3/UL (ref 0–1.3)
MONOCYTES NFR BLD AUTO: 10 %
MRSA DNA SPEC QL NAA+PROBE: NEGATIVE
NEUTROPHILS # BLD AUTO: 5.4 10E3/UL (ref 1.6–8.3)
NEUTROPHILS NFR BLD AUTO: 65 %
NRBC # BLD AUTO: 0 10E3/UL
NRBC BLD AUTO-RTO: 0 /100
PLATELET # BLD AUTO: 180 10E3/UL (ref 150–450)
POTASSIUM SERPL-SCNC: 4.1 MMOL/L (ref 3.4–5.3)
PROCALCITONIN SERPL IA-MCNC: 0.05 NG/ML
PROT SERPL-MCNC: 6.6 G/DL (ref 6.4–8.3)
RBC # BLD AUTO: 3.71 10E6/UL (ref 4.4–5.9)
SA TARGET DNA: NEGATIVE
SODIUM SERPL-SCNC: 139 MMOL/L (ref 135–145)
WBC # BLD AUTO: 8.3 10E3/UL (ref 4–11)

## 2024-02-12 PROCEDURE — 82962 GLUCOSE BLOOD TEST: CPT

## 2024-02-12 PROCEDURE — 99232 SBSQ HOSP IP/OBS MODERATE 35: CPT | Performed by: STUDENT IN AN ORGANIZED HEALTH CARE EDUCATION/TRAINING PROGRAM

## 2024-02-12 PROCEDURE — 258N000001 HC RX 258: Performed by: STUDENT IN AN ORGANIZED HEALTH CARE EDUCATION/TRAINING PROGRAM

## 2024-02-12 PROCEDURE — 86140 C-REACTIVE PROTEIN: CPT | Performed by: PHYSICIAN ASSISTANT

## 2024-02-12 PROCEDURE — 250N000011 HC RX IP 250 OP 636

## 2024-02-12 PROCEDURE — 99204 OFFICE O/P NEW MOD 45 MIN: CPT | Performed by: INTERNAL MEDICINE

## 2024-02-12 PROCEDURE — 82550 ASSAY OF CK (CPK): CPT | Performed by: INTERNAL MEDICINE

## 2024-02-12 PROCEDURE — 84145 PROCALCITONIN (PCT): CPT | Performed by: INTERNAL MEDICINE

## 2024-02-12 PROCEDURE — G0378 HOSPITAL OBSERVATION PER HR: HCPCS

## 2024-02-12 PROCEDURE — 96361 HYDRATE IV INFUSION ADD-ON: CPT

## 2024-02-12 PROCEDURE — 96376 TX/PRO/DX INJ SAME DRUG ADON: CPT

## 2024-02-12 PROCEDURE — 87070 CULTURE OTHR SPECIMN AEROBIC: CPT | Performed by: ORTHOPAEDIC SURGERY

## 2024-02-12 PROCEDURE — 250N000011 HC RX IP 250 OP 636: Performed by: ORTHOPAEDIC SURGERY

## 2024-02-12 PROCEDURE — 85025 COMPLETE CBC W/AUTO DIFF WBC: CPT | Performed by: INTERNAL MEDICINE

## 2024-02-12 PROCEDURE — 250N000009 HC RX 250: Performed by: ORTHOPAEDIC SURGERY

## 2024-02-12 PROCEDURE — 250N000013 HC RX MED GY IP 250 OP 250 PS 637: Performed by: PHYSICIAN ASSISTANT

## 2024-02-12 PROCEDURE — 999N000141 HC STATISTIC PRE-PROCEDURE NURSING ASSESSMENT: Performed by: ORTHOPAEDIC SURGERY

## 2024-02-12 PROCEDURE — 82040 ASSAY OF SERUM ALBUMIN: CPT | Performed by: INTERNAL MEDICINE

## 2024-02-12 PROCEDURE — 87075 CULTR BACTERIA EXCEPT BLOOD: CPT | Performed by: ORTHOPAEDIC SURGERY

## 2024-02-12 PROCEDURE — 258N000003 HC RX IP 258 OP 636: Performed by: INTERNAL MEDICINE

## 2024-02-12 PROCEDURE — 360N000075 HC SURGERY LEVEL 2, PER MIN: Performed by: ORTHOPAEDIC SURGERY

## 2024-02-12 PROCEDURE — 250N000011 HC RX IP 250 OP 636: Mod: JZ | Performed by: INTERNAL MEDICINE

## 2024-02-12 PROCEDURE — 96375 TX/PRO/DX INJ NEW DRUG ADDON: CPT

## 2024-02-12 PROCEDURE — 250N000011 HC RX IP 250 OP 636: Performed by: NURSE ANESTHETIST, CERTIFIED REGISTERED

## 2024-02-12 PROCEDURE — 250N000011 HC RX IP 250 OP 636: Performed by: STUDENT IN AN ORGANIZED HEALTH CARE EDUCATION/TRAINING PROGRAM

## 2024-02-12 PROCEDURE — 272N000001 HC OR GENERAL SUPPLY STERILE: Performed by: ORTHOPAEDIC SURGERY

## 2024-02-12 PROCEDURE — 370N000017 HC ANESTHESIA TECHNICAL FEE, PER MIN: Performed by: ORTHOPAEDIC SURGERY

## 2024-02-12 PROCEDURE — 120N000001 HC R&B MED SURG/OB

## 2024-02-12 PROCEDURE — 0RBX0ZZ EXCISION OF LEFT FINGER PHALANGEAL JOINT, OPEN APPROACH: ICD-10-PCS | Performed by: ORTHOPAEDIC SURGERY

## 2024-02-12 PROCEDURE — 84450 TRANSFERASE (AST) (SGOT): CPT | Performed by: INTERNAL MEDICINE

## 2024-02-12 PROCEDURE — 36415 COLL VENOUS BLD VENIPUNCTURE: CPT | Performed by: INTERNAL MEDICINE

## 2024-02-12 PROCEDURE — 250N000011 HC RX IP 250 OP 636: Performed by: INTERNAL MEDICINE

## 2024-02-12 PROCEDURE — 258N000003 HC RX IP 258 OP 636: Performed by: ANESTHESIOLOGY

## 2024-02-12 PROCEDURE — 250N000013 HC RX MED GY IP 250 OP 250 PS 637: Performed by: INTERNAL MEDICINE

## 2024-02-12 PROCEDURE — 87205 SMEAR GRAM STAIN: CPT | Performed by: ORTHOPAEDIC SURGERY

## 2024-02-12 RX ORDER — NALOXONE HYDROCHLORIDE 0.4 MG/ML
0.4 INJECTION, SOLUTION INTRAMUSCULAR; INTRAVENOUS; SUBCUTANEOUS
Status: DISCONTINUED | OUTPATIENT
Start: 2024-02-12 | End: 2024-02-15 | Stop reason: HOSPADM

## 2024-02-12 RX ORDER — CEFAZOLIN SODIUM/WATER 2 G/20 ML
2 SYRINGE (ML) INTRAVENOUS SEE ADMIN INSTRUCTIONS
Status: DISCONTINUED | OUTPATIENT
Start: 2024-02-12 | End: 2024-02-12 | Stop reason: HOSPADM

## 2024-02-12 RX ORDER — ONDANSETRON 4 MG/1
4 TABLET, ORALLY DISINTEGRATING ORAL EVERY 30 MIN PRN
Status: CANCELLED | OUTPATIENT
Start: 2024-02-12

## 2024-02-12 RX ORDER — BUPIVACAINE HYDROCHLORIDE 5 MG/ML
INJECTION, SOLUTION EPIDURAL; INTRACAUDAL
Status: DISCONTINUED
Start: 2024-02-12 | End: 2024-02-12 | Stop reason: HOSPADM

## 2024-02-12 RX ORDER — NALOXONE HYDROCHLORIDE 0.4 MG/ML
0.2 INJECTION, SOLUTION INTRAMUSCULAR; INTRAVENOUS; SUBCUTANEOUS
Status: DISCONTINUED | OUTPATIENT
Start: 2024-02-12 | End: 2024-02-15 | Stop reason: HOSPADM

## 2024-02-12 RX ORDER — ONDANSETRON 2 MG/ML
4 INJECTION INTRAMUSCULAR; INTRAVENOUS EVERY 30 MIN PRN
Status: CANCELLED | OUTPATIENT
Start: 2024-02-12

## 2024-02-12 RX ORDER — LIDOCAINE HYDROCHLORIDE 10 MG/ML
INJECTION, SOLUTION EPIDURAL; INFILTRATION; INTRACAUDAL; PERINEURAL PRN
Status: DISCONTINUED | OUTPATIENT
Start: 2024-02-12 | End: 2024-02-12 | Stop reason: HOSPADM

## 2024-02-12 RX ORDER — CEFTRIAXONE 2 G/1
2 INJECTION, POWDER, FOR SOLUTION INTRAMUSCULAR; INTRAVENOUS EVERY 24 HOURS
Status: DISCONTINUED | OUTPATIENT
Start: 2024-02-12 | End: 2024-02-15 | Stop reason: HOSPADM

## 2024-02-12 RX ORDER — LIDOCAINE 40 MG/G
CREAM TOPICAL
Status: DISCONTINUED | OUTPATIENT
Start: 2024-02-12 | End: 2024-02-15 | Stop reason: HOSPADM

## 2024-02-12 RX ORDER — LIDOCAINE HYDROCHLORIDE 10 MG/ML
INJECTION, SOLUTION EPIDURAL; INFILTRATION; INTRACAUDAL; PERINEURAL
Status: DISCONTINUED
Start: 2024-02-12 | End: 2024-02-12 | Stop reason: HOSPADM

## 2024-02-12 RX ORDER — ACETAMINOPHEN 325 MG/1
975 TABLET ORAL ONCE
Status: DISCONTINUED | OUTPATIENT
Start: 2024-02-12 | End: 2024-02-12 | Stop reason: HOSPADM

## 2024-02-12 RX ORDER — PROCHLORPERAZINE MALEATE 10 MG
10 TABLET ORAL EVERY 6 HOURS PRN
Status: DISCONTINUED | OUTPATIENT
Start: 2024-02-12 | End: 2024-02-15 | Stop reason: HOSPADM

## 2024-02-12 RX ORDER — CEFAZOLIN SODIUM/WATER 2 G/20 ML
2 SYRINGE (ML) INTRAVENOUS
Status: COMPLETED | OUTPATIENT
Start: 2024-02-12 | End: 2024-02-12

## 2024-02-12 RX ORDER — FENTANYL CITRATE 50 UG/ML
25 INJECTION, SOLUTION INTRAMUSCULAR; INTRAVENOUS EVERY 5 MIN PRN
Status: CANCELLED | OUTPATIENT
Start: 2024-02-12

## 2024-02-12 RX ORDER — FENTANYL CITRATE 50 UG/ML
50 INJECTION, SOLUTION INTRAMUSCULAR; INTRAVENOUS EVERY 5 MIN PRN
Status: CANCELLED | OUTPATIENT
Start: 2024-02-12

## 2024-02-12 RX ORDER — AMOXICILLIN 250 MG
1 CAPSULE ORAL 2 TIMES DAILY
Status: DISCONTINUED | OUTPATIENT
Start: 2024-02-12 | End: 2024-02-15 | Stop reason: HOSPADM

## 2024-02-12 RX ORDER — HYDROMORPHONE HCL IN WATER/PF 6 MG/30 ML
0.2 PATIENT CONTROLLED ANALGESIA SYRINGE INTRAVENOUS EVERY 5 MIN PRN
Status: CANCELLED | OUTPATIENT
Start: 2024-02-12

## 2024-02-12 RX ORDER — ONDANSETRON 2 MG/ML
INJECTION INTRAMUSCULAR; INTRAVENOUS PRN
Status: DISCONTINUED | OUTPATIENT
Start: 2024-02-12 | End: 2024-02-12

## 2024-02-12 RX ORDER — CLINDAMYCIN PHOSPHATE 900 MG/50ML
900 INJECTION, SOLUTION INTRAVENOUS
Status: DISCONTINUED | OUTPATIENT
Start: 2024-02-12 | End: 2024-02-12

## 2024-02-12 RX ORDER — SODIUM CHLORIDE, SODIUM LACTATE, POTASSIUM CHLORIDE, CALCIUM CHLORIDE 600; 310; 30; 20 MG/100ML; MG/100ML; MG/100ML; MG/100ML
INJECTION, SOLUTION INTRAVENOUS CONTINUOUS
Status: CANCELLED | OUTPATIENT
Start: 2024-02-12

## 2024-02-12 RX ORDER — MEPERIDINE HYDROCHLORIDE 25 MG/ML
12.5 INJECTION INTRAMUSCULAR; INTRAVENOUS; SUBCUTANEOUS EVERY 5 MIN PRN
Status: CANCELLED | OUTPATIENT
Start: 2024-02-12

## 2024-02-12 RX ORDER — LIDOCAINE 40 MG/G
CREAM TOPICAL
Status: DISCONTINUED | OUTPATIENT
Start: 2024-02-12 | End: 2024-02-12 | Stop reason: HOSPADM

## 2024-02-12 RX ORDER — PROPOFOL 10 MG/ML
INJECTION, EMULSION INTRAVENOUS PRN
Status: DISCONTINUED | OUTPATIENT
Start: 2024-02-12 | End: 2024-02-12

## 2024-02-12 RX ORDER — PROPOFOL 10 MG/ML
INJECTION, EMULSION INTRAVENOUS CONTINUOUS PRN
Status: DISCONTINUED | OUTPATIENT
Start: 2024-02-12 | End: 2024-02-12

## 2024-02-12 RX ORDER — POLYETHYLENE GLYCOL 3350 17 G/17G
17 POWDER, FOR SOLUTION ORAL DAILY
Status: DISCONTINUED | OUTPATIENT
Start: 2024-02-13 | End: 2024-02-15 | Stop reason: HOSPADM

## 2024-02-12 RX ORDER — BUPIVACAINE HYDROCHLORIDE 5 MG/ML
INJECTION, SOLUTION PERINEURAL PRN
Status: DISCONTINUED | OUTPATIENT
Start: 2024-02-12 | End: 2024-02-12 | Stop reason: HOSPADM

## 2024-02-12 RX ORDER — CLINDAMYCIN PHOSPHATE 900 MG/50ML
900 INJECTION, SOLUTION INTRAVENOUS SEE ADMIN INSTRUCTIONS
Status: DISCONTINUED | OUTPATIENT
Start: 2024-02-12 | End: 2024-02-12

## 2024-02-12 RX ORDER — ONDANSETRON 2 MG/ML
4 INJECTION INTRAMUSCULAR; INTRAVENOUS EVERY 6 HOURS PRN
Status: DISCONTINUED | OUTPATIENT
Start: 2024-02-12 | End: 2024-02-15 | Stop reason: HOSPADM

## 2024-02-12 RX ORDER — ONDANSETRON 4 MG/1
4 TABLET, ORALLY DISINTEGRATING ORAL EVERY 6 HOURS PRN
Status: DISCONTINUED | OUTPATIENT
Start: 2024-02-12 | End: 2024-02-15 | Stop reason: HOSPADM

## 2024-02-12 RX ORDER — HYDROMORPHONE HCL IN WATER/PF 6 MG/30 ML
0.2 PATIENT CONTROLLED ANALGESIA SYRINGE INTRAVENOUS EVERY 4 HOURS PRN
Status: DISCONTINUED | OUTPATIENT
Start: 2024-02-12 | End: 2024-02-15 | Stop reason: HOSPADM

## 2024-02-12 RX ORDER — SODIUM CHLORIDE, SODIUM LACTATE, POTASSIUM CHLORIDE, CALCIUM CHLORIDE 600; 310; 30; 20 MG/100ML; MG/100ML; MG/100ML; MG/100ML
INJECTION, SOLUTION INTRAVENOUS CONTINUOUS
Status: DISCONTINUED | OUTPATIENT
Start: 2024-02-12 | End: 2024-02-12 | Stop reason: HOSPADM

## 2024-02-12 RX ORDER — HYDROMORPHONE HCL IN WATER/PF 6 MG/30 ML
0.4 PATIENT CONTROLLED ANALGESIA SYRINGE INTRAVENOUS EVERY 5 MIN PRN
Status: CANCELLED | OUTPATIENT
Start: 2024-02-12

## 2024-02-12 RX ORDER — FENTANYL CITRATE 50 UG/ML
INJECTION, SOLUTION INTRAMUSCULAR; INTRAVENOUS PRN
Status: DISCONTINUED | OUTPATIENT
Start: 2024-02-12 | End: 2024-02-12

## 2024-02-12 RX ORDER — BISACODYL 10 MG
10 SUPPOSITORY, RECTAL RECTAL DAILY PRN
Status: DISCONTINUED | OUTPATIENT
Start: 2024-02-12 | End: 2024-02-15 | Stop reason: HOSPADM

## 2024-02-12 RX ORDER — HALOPERIDOL 5 MG/ML
1 INJECTION INTRAMUSCULAR
Status: CANCELLED | OUTPATIENT
Start: 2024-02-12

## 2024-02-12 RX ADMIN — MIDAZOLAM 2 MG: 1 INJECTION INTRAMUSCULAR; INTRAVENOUS at 10:26

## 2024-02-12 RX ADMIN — AMLODIPINE BESYLATE 10 MG: 10 TABLET ORAL at 08:58

## 2024-02-12 RX ADMIN — HYDROMORPHONE HYDROCHLORIDE 0.2 MG: 0.2 INJECTION, SOLUTION INTRAMUSCULAR; INTRAVENOUS; SUBCUTANEOUS at 13:41

## 2024-02-12 RX ADMIN — FEXOFENADINE HYDROCHLORIDE 60 MG: 60 TABLET ORAL at 22:12

## 2024-02-12 RX ADMIN — Medication 90 MG: at 08:57

## 2024-02-12 RX ADMIN — SODIUM CHLORIDE, POTASSIUM CHLORIDE, SODIUM LACTATE AND CALCIUM CHLORIDE: 600; 310; 30; 20 INJECTION, SOLUTION INTRAVENOUS at 09:53

## 2024-02-12 RX ADMIN — DAPTOMYCIN 750 MG: 500 INJECTION, POWDER, LYOPHILIZED, FOR SOLUTION INTRAVENOUS at 13:57

## 2024-02-12 RX ADMIN — SODIUM CHLORIDE: 9 INJECTION, SOLUTION INTRAVENOUS at 07:13

## 2024-02-12 RX ADMIN — PROPOFOL 125 MCG/KG/MIN: 10 INJECTION, EMULSION INTRAVENOUS at 10:30

## 2024-02-12 RX ADMIN — Medication 2 G: at 10:28

## 2024-02-12 RX ADMIN — CLINDAMYCIN IN 5 PERCENT DEXTROSE 900 MG: 18 INJECTION, SOLUTION INTRAVENOUS at 05:47

## 2024-02-12 RX ADMIN — VENLAFAXINE HYDROCHLORIDE 150 MG: 150 CAPSULE, EXTENDED RELEASE ORAL at 08:58

## 2024-02-12 RX ADMIN — PRAZOSIN HYDROCHLORIDE 2 MG: 2 CAPSULE ORAL at 20:19

## 2024-02-12 RX ADMIN — METOPROLOL TARTRATE 25 MG: 25 TABLET, FILM COATED ORAL at 08:58

## 2024-02-12 RX ADMIN — Medication 400 UNITS: at 08:57

## 2024-02-12 RX ADMIN — PRAZOSIN HYDROCHLORIDE 2 MG: 2 CAPSULE ORAL at 14:39

## 2024-02-12 RX ADMIN — SENNOSIDES AND DOCUSATE SODIUM 1 TABLET: 8.6; 5 TABLET ORAL at 20:19

## 2024-02-12 RX ADMIN — PRAZOSIN HYDROCHLORIDE 2 MG: 2 CAPSULE ORAL at 08:58

## 2024-02-12 RX ADMIN — FEXOFENADINE HYDROCHLORIDE 60 MG: 60 TABLET ORAL at 08:59

## 2024-02-12 RX ADMIN — ONDANSETRON 4 MG: 2 INJECTION INTRAMUSCULAR; INTRAVENOUS at 11:06

## 2024-02-12 RX ADMIN — SENNOSIDES AND DOCUSATE SODIUM 1 TABLET: 8.6; 5 TABLET ORAL at 12:40

## 2024-02-12 RX ADMIN — PANTOPRAZOLE SODIUM 40 MG: 40 TABLET, DELAYED RELEASE ORAL at 17:47

## 2024-02-12 RX ADMIN — PROPOFOL 50 MG: 10 INJECTION, EMULSION INTRAVENOUS at 10:30

## 2024-02-12 RX ADMIN — ACETAMINOPHEN 650 MG: 325 TABLET ORAL at 03:02

## 2024-02-12 RX ADMIN — HYDROCODONE BITARTRATE AND ACETAMINOPHEN 2 TABLET: 5; 325 TABLET ORAL at 17:46

## 2024-02-12 RX ADMIN — FENTANYL CITRATE 50 MCG: 50 INJECTION INTRAMUSCULAR; INTRAVENOUS at 10:30

## 2024-02-12 RX ADMIN — HYDROCODONE BITARTRATE AND ACETAMINOPHEN 2 TABLET: 5; 325 TABLET ORAL at 05:47

## 2024-02-12 RX ADMIN — ASPIRIN 81 MG: 81 TABLET, COATED ORAL at 09:01

## 2024-02-12 RX ADMIN — PANTOPRAZOLE SODIUM 40 MG: 40 TABLET, DELAYED RELEASE ORAL at 06:59

## 2024-02-12 RX ADMIN — METOPROLOL TARTRATE 25 MG: 25 TABLET, FILM COATED ORAL at 20:19

## 2024-02-12 RX ADMIN — ATORVASTATIN CALCIUM 80 MG: 40 TABLET, FILM COATED ORAL at 22:13

## 2024-02-12 RX ADMIN — FOLIC ACID 1 MG: 1 TABLET ORAL at 08:58

## 2024-02-12 RX ADMIN — HYDROCHLOROTHIAZIDE 12.5 MG: 12.5 CAPSULE ORAL at 08:58

## 2024-02-12 RX ADMIN — CHOLECALCIFEROL TAB 125 MCG (5000 UNIT) 5000 UNITS: 125 TAB at 08:58

## 2024-02-12 RX ADMIN — HYDROCODONE BITARTRATE AND ACETAMINOPHEN 2 TABLET: 5; 325 TABLET ORAL at 12:40

## 2024-02-12 RX ADMIN — HYDROMORPHONE HYDROCHLORIDE 0.2 MG: 0.2 INJECTION, SOLUTION INTRAMUSCULAR; INTRAVENOUS; SUBCUTANEOUS at 22:14

## 2024-02-12 RX ADMIN — CYANOCOBALAMIN TAB 1000 MCG 1000 MCG: 1000 TAB at 08:58

## 2024-02-12 RX ADMIN — CEFTRIAXONE SODIUM 2 G: 2 INJECTION, POWDER, FOR SOLUTION INTRAMUSCULAR; INTRAVENOUS at 13:16

## 2024-02-12 RX ADMIN — THERA TABS 1 TABLET: TAB at 08:57

## 2024-02-12 RX ADMIN — MONTELUKAST SODIUM 10 MG: 10 TABLET, COATED ORAL at 22:12

## 2024-02-12 ASSESSMENT — ACTIVITIES OF DAILY LIVING (ADL)
ADLS_ACUITY_SCORE: 22

## 2024-02-12 NOTE — PROGRESS NOTES
Therapy: IV abx     Insurance: [employer]       Pt has IV abx coverage with [employer] plan, ded $650 (met $0), 80/20 coverage, OOP $5400 (met $249.54 at this time), once OOP is fully met, coverage should be at 100%.     In reference to admission on 2/11/24 to check IV abx coverage     Please contact Intake with any questions, [phone] or In Basket pool, [facility] ([phone]).

## 2024-02-12 NOTE — PHARMACY-ADMISSION MEDICATION HISTORY
Pharmacist Admission Medication History    Admission medication history is complete. The information provided in this note is only as accurate as the sources available at the time of the update.    Information Source(s): Patient and CareEverywhere/SureScripts via in-person    Pertinent Information:     Changes made to PTA medication list:  All medications are new    Allergies reviewed with patient and updates made in EHR: yes    Medication History Completed By: Danial Valderrama [facility] 2/11/2024 9:36 PM    PTA Med List   Medication Sig Last Dose    acetaminophen (TYLENOL) 500 MG tablet Take 500-1,000 mg by mouth every 6 hours as needed for mild pain Past Week    amLODIPine-Olmesartan 10-40 MG TABS Take 1 tablet by mouth daily 2/11/2024 at am    aspirin 81 MG EC tablet Take 81 mg by mouth daily 2/11/2024 at am    atorvastatin (LIPITOR) 80 MG tablet Take 80 mg by mouth at bedtime 2/11/2024 at pm    cephALEXin (KEFLEX) 500 MG capsule Take 500 mg by mouth 3 times daily 2/11/2024 at pm x3    cholecalciferol 125 MCG (5000 UT) CAPS Take 5,000 Units by mouth daily 2/11/2024 at am    Coenzyme Q10 400 MG CAPS Take 400 mg by mouth daily 2/11/2024 at am    cyanocobalamin (VITAMIN B-12) 1000 MCG tablet Take 1,000 mcg by mouth daily 2/11/2024 at am    Ferrous Sulfate 90 (18 Fe) MG TABS Take 1 tablet by mouth daily 2/11/2024 at am    fexofenadine (ALLEGRA) 60 MG tablet Take 60 mg by mouth 2 times daily 2/11/2024 at pm    fluticasone (FLONASE) 50 MCG/ACT nasal spray Spray 2 sprays into both nostrils daily 2/11/2024 at am    folic acid (FOLVITE) 1 MG tablet Take 1 mg by mouth daily 2/11/2024 at am    hydrochlorothiazide (MICROZIDE) 12.5 MG capsule Take 1 capsule by mouth daily 2/11/2024 at am    JANUVIA 25 MG tablet Take 1 tablet by mouth daily 2/11/2024 at am    magnesium 200 MG TABS Take 1 tablet by mouth daily 2/11/2024 at am    metFORMIN (GLUCOPHAGE) 500 MG tablet Take 1,000 mg by mouth 2 times daily (with meals) 2/11/2024 at pm     metoprolol tartrate (LOPRESSOR) 25 MG tablet Take 25 mg by mouth 2 times daily 2/11/2024 at pm    montelukast (SINGULAIR) 10 MG tablet Take 10 mg by mouth at bedtime 2/11/2024 at pm    Multiple Vitamin (MULTI VITAMIN) TABS Take 1 tablet by mouth daily 2/11/2024 at am    omega 3 1200 MG CAPS Take 1 capsule by mouth daily 2/11/2024 at am    omeprazole (PRILOSEC) 40 MG DR capsule Take 40 mg by mouth 2 times daily (before meals) 2/11/2024 at pm    prazosin (MINIPRESS) 2 MG capsule Take 2 mg by mouth 3 times daily 2/11/2024 at pm x3    Testosterone Enanthate (XYOSTED) 100 MG/0.5ML SOAJ Inject 100 mg Subcutaneous once a week 2/7/2024    TRULICITY 0.75 MG/0.5ML pen Inject 0.75 mg Subcutaneous every 7 days 2/7/2024    venlafaxine (EFFEXOR XR) 150 MG 24 hr capsule Take 150 mg by mouth daily 2/11/2024 at am    Vitamin E 268 MG (400 UNIT) CAPS Take 1 capsule by mouth daily 2/11/2024 at am

## 2024-02-12 NOTE — PROGRESS NOTES
[facility]    Medicine Progress Note - Hospitalist Service    Date of Admission:  2/11/2024    Assessment & Plan   54-year-old male with past medical history of hypertension, type 2 diabetes, depression, GARETT on CPAP, and asthma who to the emergency department with painful left thumb wound that was purulent and limited movement in the joint, given this orthopedics was consulted and took patient to OR on 2/12 for septic arthritis.    #Laceration Left thumb  #Left thumb IP joint septic arthritis  -S/P OR on 2/12  -ID consulted, appreciate recs  -Orthopedics consulted, appreciate recs  -Ceftriaxone/Dapto per ID, OR cultures pending    #T2DM  -MDSSI  -Holding PTA oral hyperglycemics given patients hospital status    Chronic medical conditions:  HTN-Holding Losartan given OR, Amlodipine, metoprolol, and HCTZ with holding parameters  Depression-PTA Venlafaxine, prazosin  Asthma-PTA meds         Observation Goals: Discharge Goals that MUST be met PRIOR to Discharge IF PATIENT IS REGISTERED AS OUTPATIENT:, ~ Patient vital signs are at baseline,, ~ Patient able to ambulate as they were prior to admission or with assist devices provided by therapies during their stay. , ~ Patient MUST void prior to discharge,, ~ Patient able to tolerate oral intake to maintain hydration status,, ~ Patient has adequate pain control using Oral analgesics,, ~ Hypercapnia, hypoventilation or hypoxia resolved for at least 2 hours without supplemental oxygen,, ~ Deficits in sensation, mobility or coordination have resolved if spinal or regional anesthesia was used, , ~ Patient has returned to baseline mental status,, ~ Notify Provider IF patient FAILS to meet Discharge Goal criteria, , ~ IF Provider has already entered all discharge goals and/or cleared patient for discharge, you do NOT need to notify Provider PRIOR to patient discharge.  Diet: Advance Diet as Tolerated: Regular Diet Adult  Discharge Instruction - Regular  Diet Adult    DVT Prophylaxis: VTE Prophylaxis contraindicated due to OR  David Catheter: Not present  Lines: None     Cardiac Monitoring: None  Code Status: Full Code      Clinically Significant Risk Factors Present on Admission                # Drug Induced Platelet Defect: home medication list includes an antiplatelet medication   # Hypertension: Home medication list includes antihypertensive(s)      # Obesity: Estimated body mass index is 35.53 kg/m  as calculated from the following:    Height as of this encounter: 1.829 m (6').    Weight as of this encounter: 118.8 kg (262 lb).              Disposition Plan      Expected Discharge Date: 02/13/2024        Discharge Comments: I&D 2/12            Harvey Gomez MD  Hospitalist Service  [facility]  Securely message with [email or web address] (more info)  Text page via [employer] Paging/Directory   ______________________________________________________________________    Interval History   Overnight no other acute events.  Evaluated patient bedside with ortho, pain in his finger unable to move it, is getting ready to go to the OR.  Denies any questions or concerns with me.    Physical Exam   Vital Signs: Temp: 97.9  F (36.6  C) Temp src: Oral BP: (!) 151/82 Pulse: 87   Resp: 17 SpO2: 95 % O2 Device: None (Room air)    Weight: 262 lbs 0 oz    Gen: Appears well, NAD, on RA  Card:Warm well perfused, pulses assumed patient talking  Pulm: Normal I/E effort on RA  Abd:Non distended  Skin/MSK: Erythema over thumb and Ip joint, laceration, ortho removed gauze with some purulent fluid, no streaking. Otherwise No obvious rashes or lesions on exposed areas of skin  Neuro AxOx4, S/S grossly intact, no obvious FND,   Psych:Pleasant, answering questions appropriately, insight good, judgement good, does not appear to be responding to I/E stimuli     Medical Decision Making       40 MINUTES SPENT BY ME on the date of service doing chart review, history, exam, documentation &  further activities per the note.      Data   ------------------------- PAST 24 HR DATA REVIEWED -----------------------------------------------    I have personally reviewed the following data over the past 24 hrs:    8.3  \   11.6 (L)   / 180     139 104 11.1 /  123 (H)   4.1 26 0.74 \     ALT: 20 AST: 19 AP: 52 TBILI: 0.6   ALB: 4.1 TOT PROTEIN: 6.6 LIPASE: N/A     Procal: 0.05 CRP: 93.20 (H) Lactic Acid: N/A         Imaging results reviewed over the past 24 hrs:   Recent Results (from the past 24 hour(s))   XR Finger Left G/E 2 Views    Narrative    EXAM: XR FINGER LEFT G/E 2 VIEWS  LOCATION: [facility]  DATE: 2/11/2024    INDICATION: left thumb pain, trauma, non healing wound  COMPARISON: None.      Impression    IMPRESSION: Mild degenerative changes and spurring at the IP joint of the left thumb. Exam otherwise negative. No evidence for fracture. No findings for osteomyelitis.

## 2024-02-12 NOTE — ED PROVIDER NOTES
EMERGENCY DEPARTMENT ENCOUNTER      NAME: Mo Aleman  AGE: 54 year old male  YOB: 1970  MRN: [id number]  EVALUATION DATE & TIME: 2/11/2024  8:07 PM    PCP: No primary care provider on file.    ED PROVIDER: Vineet Bobo M.D.      Chief Complaint   Patient presents with    Wound Check     Left thumb         FINAL IMPRESSION:  Left thumb infection      ED COURSE & MEDICAL DECISION MAKING:    Pertinent Labs & Imaging studies reviewed. (See chart for details)  54 year old male presents to the Emergency Department for evaluation of painful wound to left thumb.  Patient initially cut the thumb approximately 10 days ago.  Was healing well and then banged it on a counter a few days ago.  Since then is increasingly swollen and red and uncomfortable.  Seen in urgent care yesterday and started on cephalexin but symptoms worsening.  Now has pain extending into the palm of the hand.  Also noted some discharge from the wound.  Examination reveals fusiform swelling of the digit with laceration over the dorsal surface and the interphalangeal joint.  Some purulent discharge noted with attempts to bend the joint.  Patient with marked discomfort with attempts at flexion.  Patient with tenderness extending along the thenar eminence suggesting deeper infection/tendon sheath infection.  Will call orthopedics and initiate antibiotics.  Patient will likely require operative intervention.. Patient appears non toxic with stable vitals signs.         8:12 PM I met with the patient for the initial interview and physical examination. Discussed plan for treatment and workup in the ED.    8:34 PM I spoke to Dr. Crenshaw, [facility], regarding patient plan of care.  9:24 PM Spoke with Dr. Bah, Hospitalist, regarding plan for admission.  He understands patient remains n.p.o. after midnight in anticipation of surgery in the morning with  orthopedics.  Blood work pending.    At the conclusion of the encounter I discussed the  results of all of the tests and the disposition. The questions were answered and return precautions provided. The patient or family acknowledged understanding and was agreeable with the care plan.       PPE: Provider wore paper mask  MEDICATIONS GIVEN IN THE EMERGENCY:  Medications - No data to display    NEW PRESCRIPTIONS STARTED AT TODAY'S ER VISIT  New Prescriptions    No medications on file          =================================================================    HPI    Patient information was obtained from: patient     Use of Intrepreter: N/A       Mo Aleman is a 54 year old male with a pertinent history of diabetes mellitus type 2 who presents to the ED for evaluation of wound check.    The patient endured a laceration to his left thumb about 8 days ago from a tool. It was healing well. 3 days ago, the patient hit the covered wound on a countertop and in the following days, the area swelled up more. Yesterday, the patient deceived antibiotics, but his swelling on the thumb has still worsened.     The patient last ate 1 hour ago (around 7 PM).    The patient has diabetes mellitus type 2. Last A1C was roughly 637. It is controlled.    Per chart review, the patient presented to [facility] on 10-Feb-2024 for evaluation of wound check. Laceration to the left thumb last week, now swollen. Started patient on Cephalexin.       REVIEW OF SYSTEMS   Constitutional:  Denies fever, chills  Respiratory:  Denies productive cough or increased work of breathing  Cardiovascular:  Denies chest pain, palpitations  GI:  Denies abdominal pain, nausea, vomiting, or change in bowel or bladder habits   Musculoskeletal:  Denies any new muscle/joint swelling  Skin:  Denies rash . Endorses laceration to the left thumb with swelling.  Neurologic:  Denies focal weakness  All systems negative except as marked.     PAST MEDICAL HISTORY:  No past medical history on file.    PAST SURGICAL HISTORY:  Past Surgical History:    Procedure Laterality Date    KNEE SURGERY           CURRENT MEDICATIONS:    No current facility-administered medications for this encounter.  No current outpatient medications on file.    ALLERGIES:  Allergies   Allergen Reactions    Penicillins Unknown     Loss of airway    Percocet [Oxycodone-Acetaminophen] Unknown     nausea    Sulfa (Sulfonamide Antibiotics) [Sulfa Antibiotics] Unknown     hives       FAMILY HISTORY:  No family history on file.    SOCIAL HISTORY:   Social History     Socioeconomic History    Marital status:    Tobacco Use    Smoking status: Never   Substance and Sexual Activity    Alcohol use: Yes     Alcohol/week: 21.0 standard drinks of alcohol       VITALS:  Patient Vitals for the past 24 hrs:   BP Temp Temp src Pulse Resp SpO2 Height Weight   02/11/24 2002 (!) 168/90 97.9  F (36.6  C) Temporal 113 19 97 % 1.829 m (6') 118.8 kg (262 lb)        PHYSICAL EXAM    Constitutional:  Awake, alert, in no apparent distress  HENT:  Normocephalic, Atraumatic. Bilateral external ears normal. Oropharynx moist. Nose normal. Neck- Normal range of motion with no guarding, No midline cervical tenderness, Supple, No stridor.   Eyes:  PERRL, EOMI with no signs of entrapment, Conjunctiva normal, No discharge.   Respiratory:  Normal breath sounds, No respiratory distress, No wheezing.    Cardiovascular:  Normal heart rate, Normal rhythm, No appreciable rubs or gallops.   GI:  Soft, No tenderness, No distension, No palpable masses  Musculoskeletal:  Intact distal pulses, No edema. Good range of motion in all major joints. No tenderness to palpation or major deformities noted.  Integument:  Warm, Dry, No rash. Fusiform swelling and erythema to the left thumb with a laceration over the dorsal aspect of the interphalangeal joint.  Neurologic:  Alert & oriented, Normal motor function, Normal sensory function, No focal deficits noted.   Psychiatric:  Affect normal, Judgment normal, Mood normal.          LAB:  All pertinent labs reviewed and interpreted.     Results for orders placed or performed during the hospital encounter of 02/11/24   CBC (+ platelets, no diff)     Status: Abnormal   Result Value Ref Range    WBC Count 9.9 4.0 - 11.0 10e3/uL    RBC Count 4.06 (L) 4.40 - 5.90 10e6/uL    Hemoglobin 12.6 (L) 13.3 - 17.7 g/dL    Hematocrit 35.7 (L) 40.0 - 53.0 %    MCV 88 78 - 100 fL    MCH 31.0 26.5 - 33.0 pg    MCHC 35.3 31.5 - 36.5 g/dL    RDW 13.4 10.0 - 15.0 %    Platelet Count 189 150 - 450 10e3/uL   CRP inflammation     Status: Abnormal   Result Value Ref Range    CRP Inflammation 79.30 (H) <5.00 mg/L      RADIOLOGY:  Reviewed all pertinent imaging. Please see official radiology report.  No orders to display       I, German Rios, am serving as a scribe to document services personally performed by Vineet Bobo MD, based on my observation and the provider's statements to me. I, Vineet Bobo MD attest that German Rios is acting in a scribe capacity, has observed my performance of the services and has documented them in accordance with my direction.    Vineet Bobo M.D.  Emergency Medicine  [facility] EMERGENCY ROOM     Vineet Bobo MD  02/11/24 6004

## 2024-02-12 NOTE — ED TRIAGE NOTES
Patient presents to the ED complaining of a possible left hand infection.  He states he cut his left thumb 8 days ago and it was healing well until he hit it again Thursday and started getting worse.  He went to urgent care yesterday and was started on Keflex but the redness and swelling is getting worse.       Triage Assessment (Adult)       Row Name 02/11/24 2003          Triage Assessment    Airway WDL WDL        Respiratory WDL    Respiratory WDL WDL        Skin Circulation/Temperature WDL    Skin Circulation/Temperature WDL WDL        Cardiac WDL    Cardiac WDL WDL        Peripheral/Neurovascular WDL    Peripheral Neurovascular WDL WDL        Cognitive/Neuro/Behavioral WDL    Cognitive/Neuro/Behavioral WDL WDL

## 2024-02-12 NOTE — PLAN OF CARE
PRIMARY DIAGNOSIS: Laceration of left thumb with infection  OUTPATIENT/OBSERVATION GOALS TO BE MET BEFORE DISCHARGE:  ADLs back to baseline: Yes    Activity and level of assistance: Ambulating independently.    Pain status: Improved-controlled with oral pain medications.    Return to near baseline physical activity: Yes     Discharge Planner Nurse   Safe discharge environment identified: No  Barriers to discharge: No       Entered by: June Thomas, RN      Please review provider order for any additional goals.   Nurse to notify provider when observation goals have been met and patient is ready for discharge.    Pt A&Ox4, VSS, RA, Afebrile. IV patent. INDP. Pt transferred to 2114

## 2024-02-12 NOTE — OP NOTE
Surgeon: Makayla Crenshaw M.D.     Assistant: Jacquelyn Lanier PA-C     Preoperative diagnosis: Left thumb IP joint septic arthritis      Postoperative diagnosis: Left thumb IP joint septic arthritis     Operation/procedures performed: Left thumb IP joint arthrotomy with surgical debridement and irrigation.     Sedation/anesthesia: Left, digital block supplemented with MAC sedation     Complications: None     Drains: A segment of a Penrose drain was placed into the wound to allow for drainage     Estimated blood loss: 1 cc     Implants: None     Specimens:   1.  The dorsal thumb IP joint abscess was swabbed and sent for stat Gram stain, aerobic culture, and anaerobic culture.  2.  The thumb IP joint synovial fluid was swabbed and sent for stat Gram stain, aerobic culture, and anaerobic culture.       Indications for Surgery:  The patient is a 54-year-old male who sustained a laceration over his left thumb IP joint approximately 8 days ago from a tool.  It was healing well.  3 days ago, the patient hit the covered wound on a countertop.  He then started developing increased swelling and pain.  The patient was started on cephalexin on 2/10/2024 after a visit to entire urgent care.  This did not significantly improve his symptoms.Based on the patient's history, clinical exam, lab results, I believe the patient likely has a pyogenic arthritis of the left elbow joint.  I recommended doing an left thumb IP thumb IP joint arthrotomy with debridement.  The risks, benefits, and alternatives of surgical intervention were thoroughly discussed with the patient and his wife.  Risks of surgery include but are not limited to the risks of anesthesia, infection, neurovascular injury, nonunion, nonunion, and/or the development of scar tissue.  Consequences of such adverse events may result in loss of life, loss of limb, the need for additional surgery, chronic pain, loss of motion and/or loss of strength.  The patient had an opportunity  "to ask questions which I answered.  They verbalized understanding and agreed with the plan as outlined above.     Disposition: Recovery room in good condition     Operation description: The patient was identified, informed consent was obtained, and the surgical site was marked in the holding area.  The patient was brought to the operating room and positioned supine with the left upper extremity on an arm table.  The anesthetist then administered general anesthesia.     After a \"timeout\" was performed, a left thumb digital block was performed using 10 cc of a one-to-one mixture of 1% lidocaine plain and 0.5% Marcaine plain.  The patient's left upper extremity was then prepped and draped in standard surgical fashion.  This included placing a well-padded tourniquet on the upper arm.  Prior to beginning the case a second \"timeout\" was performed by the surgical team to confirm surgical site, side, and procedure.     I made a T-shaped incision over the IP joint of the left thumb.  The incision was made with a 15 blade through skin only.  I then dissected through the soft tissue.  Bipolar cautery was used to cauterize the small crossing vessels.       There was gross purulence dorsal to the left thumb extensor tendon and joint capsule.  The fluid that came out was cloudy and appeared purulent in nature.  This fluid was swabbed and the culture swab was sent to microbiology for stat Gram stain, aerobic and anaerobic culture.    It became clear that there was a rent in the extensor tendon and the dorsal capsule of the IP joint.  However the extensor tendon itself was intact.  There was purulent fluid from the IP joint itself.  This left thumb IP joint was then swabbed and the culture swab was sent to microbiology for stat Gram stain, aerobic and anaerobic culture.there was some excessive synovial tissue arising from the joint.  I debrided the synovial tissue with a rongeur.  The joint was then copiously irrigated  using 3 L " of normal saline impregnated with 3 g of clindamycin.       A small segment of 1/4 inch Penrose drain was then placed into the wound to allow for drainage.  The skin incision was closed with interrupted 4-0 nylon placed in a horizontal mattress fashion.  The incision was sterilely dressed.  The patient was then awoken from anesthesia.  The patient was transferred out of the OR in good condition.     POSTOPERATIVE PLAN: Continue IV antibiotics.  I have recommended getting an ID consult.  We will keep the drain in place for 24-36 hours.  I would like to see the patient back in my office in approximately 1 week from discharge.

## 2024-02-12 NOTE — CONSULTS
ORTHOPEDIC CONSULTATION    Consultation  Mo Aleman,  1970, MRN [id number]    [place]  Laceration of left thumb with infection, initial encounter [S61.012A, L08.9]    PCP: Mickey Morse, [phone]   Code status:  Full Code       Extended Emergency Contact Information  Primary Emergency Contact: Lennie Aleman   [facility]  Mobile Phone: [phone]  Relation: Spouse         IMPRESSION:  Laceration of the left thumb with overlying infection, initial encounter     PLAN:  This patient was discussed with Dr. Crenshaw, on-call surgeon for [facility] and they are in agreement with the following plan.     - NPO surgery at 10am  - pain control  - continue IV antibiotics    Thank you for including [facility] in the care of Mo Aleman. It has been a pleasure participating in Mo's care.        CHIEF COMPLAINT: Laceration of left thumb with infection, initial encounter    HISTORY OF PRESENT ILLNESS:  The patient is seen in orthopedic consultation at the request of Dr. Gomez.  The patient is a 54 year old male with moderate pain of the left   Thumb . The patient reports that he got a laceration to his left thumb about 8 days ago from a tool. It was healing well. 3 days ago, the patient hit the covered wound on a countertop and in the following days, the area swelled up more. Yesterday, the patient deceived antibiotics, but his swelling on the thumb has still worsened.      Per chart review, the patient presented to [facility] on 10-Feb-2024 for evaluation of wound check. Laceration to the left thumb last week, now swollen. Started patient on Cephalexin.       PAST MEDICAL HISTORY:  History reviewed. No pertinent past medical history.       ALLERGIES:   Review of patient's allergies indicates   Allergies   Allergen Reactions    Penicillins Unknown     Loss of airway    Percocet [Oxycodone-Acetaminophen] Unknown     nausea    Sulfa (Sulfonamide Antibiotics) [Sulfa  Antibiotics] Unknown     hives         MEDICATIONS UPON ADMISSION:  Medications were reviewed.  They include:   Medications Prior to Admission   Medication Sig Dispense Refill Last Dose    acetaminophen (TYLENOL) 500 MG tablet Take 500-1,000 mg by mouth every 6 hours as needed for mild pain   Past Week    amLODIPine-Olmesartan 10-40 MG TABS Take 1 tablet by mouth daily   2/11/2024 at am    aspirin 81 MG EC tablet Take 81 mg by mouth daily   2/11/2024 at am    atorvastatin (LIPITOR) 80 MG tablet Take 80 mg by mouth at bedtime   2/11/2024 at pm    cephALEXin (KEFLEX) 500 MG capsule Take 500 mg by mouth 3 times daily   2/11/2024 at pm x3    cholecalciferol 125 MCG (5000 UT) CAPS Take 5,000 Units by mouth daily   2/11/2024 at am    Coenzyme Q10 400 MG CAPS Take 400 mg by mouth daily   2/11/2024 at am    cyanocobalamin (VITAMIN B-12) 1000 MCG tablet Take 1,000 mcg by mouth daily   2/11/2024 at am    Ferrous Sulfate 90 (18 Fe) MG TABS Take 1 tablet by mouth daily   2/11/2024 at am    fexofenadine (ALLEGRA) 60 MG tablet Take 60 mg by mouth 2 times daily   2/11/2024 at pm    fluticasone (FLONASE) 50 MCG/ACT nasal spray Spray 2 sprays into both nostrils daily   2/11/2024 at am    folic acid (FOLVITE) 1 MG tablet Take 1 mg by mouth daily   2/11/2024 at am    hydrochlorothiazide (MICROZIDE) 12.5 MG capsule Take 1 capsule by mouth daily   2/11/2024 at am    JANUVIA 25 MG tablet Take 1 tablet by mouth daily   2/11/2024 at am    magnesium 200 MG TABS Take 1 tablet by mouth daily   2/11/2024 at am    metFORMIN (GLUCOPHAGE) 500 MG tablet Take 1,000 mg by mouth 2 times daily (with meals)   2/11/2024 at pm    metoprolol tartrate (LOPRESSOR) 25 MG tablet Take 25 mg by mouth 2 times daily   2/11/2024 at pm    montelukast (SINGULAIR) 10 MG tablet Take 10 mg by mouth at bedtime   2/11/2024 at pm    Multiple Vitamin (MULTI VITAMIN) TABS Take 1 tablet by mouth daily   2/11/2024 at am    omega 3 1200 MG CAPS Take 1 capsule by mouth daily    2/11/2024 at am    omeprazole (PRILOSEC) 40 MG DR capsule Take 40 mg by mouth 2 times daily (before meals)   2/11/2024 at pm    prazosin (MINIPRESS) 2 MG capsule Take 2 mg by mouth 3 times daily   2/11/2024 at pm x3    Testosterone Enanthate (XYOSTED) 100 MG/0.5ML SOAJ Inject 100 mg Subcutaneous once a week   2/7/2024    TRULICITY 0.75 MG/0.5ML pen Inject 0.75 mg Subcutaneous every 7 days   2/7/2024    venlafaxine (EFFEXOR XR) 150 MG 24 hr capsule Take 150 mg by mouth daily   2/11/2024 at am    Vitamin E 268 MG (400 UNIT) CAPS Take 1 capsule by mouth daily   2/11/2024 at am         SOCIAL HISTORY:   he  reports that he has never smoked. He does not have any smokeless tobacco history on file. He reports current alcohol use of about 21.0 standard drinks of alcohol per week.    FAMILY HISTORY:  family history is not on file.      REVIEW OF SYSTEMS:   Reviewed with patient. See HPI, otherwise negative       PHYSICAL EXAMINATION:  Vitals: Temp:  [97.8  F (36.6  C)-98.1  F (36.7  C)] 98.1  F (36.7  C)  Pulse:  [] 90  Resp:  [18-19] 18  BP: (132-168)/(71-90) 150/82  SpO2:  [95 %-98 %] 96 %  General: On examination, the patient is resting comfortably, NAD, awake, and alert and oriented to person, place, time, and, and general circumstances   SKIN: There is evidence of mild erythema over the thumb IP joint, with small laceration that is oozing purulent fluid.  Moderate swelling over the IP joint extending into the proximal thumb.  No erythema extending into the dorsum of the left hand.  Pulses:  radial pulse is intact and equal bilaterally  Sensation: intact and equal bilaterally to the distal upper extremities.  Tenderness: Tenderness to palpation over the thumb IP joint  ROM: Only able to range at the thumb MCP joint.  No range of motion due to pain and swelling of the IP joint  Contralateral side= Full range of motion, Negative joint instability findings, 5/5 motor groups about the joint, Non-tender.        RADIOGRAPHIC EVALUATION:  Personally reviewed    PERTINENT LABS:  Lab Results: personally reviewed.   @BRIEFLAB[NA,K,CL,CO2,BUN,CREATININE,GLUCOSE,GLUCOSE @  Lab Results   Component Value Date    WBC 8.3 02/12/2024    HGB 11.6 02/12/2024    HCT 33.8 02/12/2024    MCV 91 02/12/2024     02/12/2024         Shanelle Garcia PA-C, PATTY  Date: 2/12/2024  Time: 8:45 AM    CC1:   Harvey Gomez MD    CC2:   Mickey Morse

## 2024-02-12 NOTE — CONSULTS
Consultation - INFECTIOUS DISEASE CONSULTATION  Mo Aleman ,  1970, MRN [id number]      Laceration of left thumb with infection, initial encounter [S61.012A, L08.9]    PCP: Mickey Morse, [phone]   Code status:  Full Code               Assessment:  Left thumb IP joint septic arthritis, abscess: rapid onset following cut. S/p I&D, arthrotomy , OR cultures and GS are pending.  Remote PCN allergy: tolerates cephalosporins (and per patient amoxicillin)     Principal Problem:    Laceration of left thumb with infection, initial encounter        Recommendations:   - ceftriaxone IV  - daptomycin IV, pending GS and MRSA swab  - follow OR cultures, GS  - further recommendations to follow clinical course  - ID will follow, thanks    Sue Stein MD  [facility]  [phone]       HPI:    Mo Aleman is a 54 year old male. History is provided by patient, chart, family.  Injured when a  broke at home, cut his left thumb at first knuckle. Initially did ok and healed over, then Thursday accidentally hit the thumb, opened up and started bleeding again. Then over the next few days progressive erythema and pain, some purulence. Max to base of thumb. No real fevers or chills. Took keflex without improvement. Came to ED, given abx. Went to OR this am for below procedure  Preoperative diagnosis: Left thumb IP joint septic arthritis   Postoperative diagnosis: Left thumb IP joint septic arthritis  Operation/procedures performed: Left thumb IP joint arthrotomy with surgical debridement and irrigation.    Seen after OR. Feeling ok. Pain is better than before. Says remove PCN allergy was told that his throat swelled up however tolerated keflex, cefazolin recently and apparently amoxicillin as well.     Chief complaint: Principal Problem:    Laceration of left thumb with infection, initial encounter      Medical History  Active Ambulatory Problems     Diagnosis Date  Noted    No Active Ambulatory Problems     Resolved Ambulatory Problems     Diagnosis Date Noted    No Resolved Ambulatory Problems     No Additional Past Medical History         Surgical History  He  has a past surgical history that includes knee surgery.       Social History  Reviewed, and he  reports that he has never smoked. He does not have any smokeless tobacco history on file. He reports current alcohol use of about 21.0 standard drinks of alcohol per week.        Family History  Reviewed and noncontributory to present problem   Psychosocial Needs  Social History     Social History Narrative    Not on file     Additional psychosocial needs reviewed per nursing assessment.       Allergies   Allergen Reactions    Penicillins Unknown     Loss of airway    Percocet [Oxycodone-Acetaminophen] Unknown     nausea    Sulfa (Sulfonamide Antibiotics) [Sulfa Antibiotics] Unknown     hives      Medications Prior to Admission   Medication Sig Dispense Refill Last Dose    acetaminophen (TYLENOL) 500 MG tablet Take 500-1,000 mg by mouth every 6 hours as needed for mild pain   Past Week    amLODIPine-Olmesartan 10-40 MG TABS Take 1 tablet by mouth daily   2/11/2024 at am    aspirin 81 MG EC tablet Take 81 mg by mouth daily   2/11/2024 at am    atorvastatin (LIPITOR) 80 MG tablet Take 80 mg by mouth at bedtime   2/11/2024 at pm    cephALEXin (KEFLEX) 500 MG capsule Take 500 mg by mouth 3 times daily   2/11/2024 at pm x3    cholecalciferol 125 MCG (5000 UT) CAPS Take 5,000 Units by mouth daily   2/11/2024 at am    Coenzyme Q10 400 MG CAPS Take 400 mg by mouth daily   2/11/2024 at am    cyanocobalamin (VITAMIN B-12) 1000 MCG tablet Take 1,000 mcg by mouth daily   2/11/2024 at am    Ferrous Sulfate 90 (18 Fe) MG TABS Take 1 tablet by mouth daily   2/11/2024 at am    fexofenadine (ALLEGRA) 60 MG tablet Take 60 mg by mouth 2 times daily   2/11/2024 at pm    fluticasone (FLONASE) 50 MCG/ACT nasal spray Spray 2 sprays into both  nostrils daily   2/11/2024 at am    folic acid (FOLVITE) 1 MG tablet Take 1 mg by mouth daily   2/11/2024 at am    hydrochlorothiazide (MICROZIDE) 12.5 MG capsule Take 1 capsule by mouth daily   2/11/2024 at am    JANUVIA 25 MG tablet Take 1 tablet by mouth daily   2/11/2024 at am    magnesium 200 MG TABS Take 1 tablet by mouth daily   2/11/2024 at am    metFORMIN (GLUCOPHAGE) 500 MG tablet Take 1,000 mg by mouth 2 times daily (with meals)   2/11/2024 at pm    metoprolol tartrate (LOPRESSOR) 25 MG tablet Take 25 mg by mouth 2 times daily   2/11/2024 at pm    montelukast (SINGULAIR) 10 MG tablet Take 10 mg by mouth at bedtime   2/11/2024 at pm    Multiple Vitamin (MULTI VITAMIN) TABS Take 1 tablet by mouth daily   2/11/2024 at am    omega 3 1200 MG CAPS Take 1 capsule by mouth daily   2/11/2024 at am    omeprazole (PRILOSEC) 40 MG DR capsule Take 40 mg by mouth 2 times daily (before meals)   2/11/2024 at pm    prazosin (MINIPRESS) 2 MG capsule Take 2 mg by mouth 3 times daily   2/11/2024 at pm x3    Testosterone Enanthate (XYOSTED) 100 MG/0.5ML SOAJ Inject 100 mg Subcutaneous once a week   2/7/2024    TRULICITY 0.75 MG/0.5ML pen Inject 0.75 mg Subcutaneous every 7 days   2/7/2024    venlafaxine (EFFEXOR XR) 150 MG 24 hr capsule Take 150 mg by mouth daily   2/11/2024 at am    Vitamin E 268 MG (400 UNIT) CAPS Take 1 capsule by mouth daily   2/11/2024 at am        Review of Systems:  A 12 point comprehensive review of systems was negative except as noted. Physical Exam:  Temp:  [97.8  F (36.6  C)-98.1  F (36.7  C)] 97.8  F (36.6  C)  Pulse:  [] 86  Resp:  [16-19] 16  BP: (132-168)/(71-90) 143/78  SpO2:  [93 %-98 %] 93 %    GEN: alert and oriented x3, NAD  HEAD: atraumatic  ENT: moist membranes, no thrush, anicteric sclera  NECK: supple, no nuchal rigidity  CARDIOVASCULAR: regular rate and rhythm, no murmurs, rubs, or gallops  PULMONARY: lungs clear to ausculation bilaterally  ABDOMEN: soft, nontender,  nondistended. Normal bowel sounds  SKIN: no rashes or lesions. No stigma of endocarditis. Left thumb in post operative wraps.   PSYCH: grossly intact  MUSCULOSKELETAL: no synovitis               Pertinent Labs  personally reviewed.   CBC RESULTS:   Recent Labs   Lab Test 02/12/24  0551   WBC 8.3   RBC 3.71*   HGB 11.6*   HCT 33.8*   MCV 91   MCH 31.3   MCHC 34.3   RDW 13.4           Last Comprehensive Metabolic Panel:  Sodium   Date Value Ref Range Status   02/12/2024 139 135 - 145 mmol/L Final     Comment:     Reference intervals for this test were updated on 09/26/2023 to more accurately reflect our healthy population. There may be differences in the flagging of prior results with similar values performed with this method. Interpretation of those prior results can be made in the context of the updated reference intervals.      Potassium   Date Value Ref Range Status   02/12/2024 4.1 3.4 - 5.3 mmol/L Final   02/18/2022 3.7 3.5 - 5.0 mmol/L Final     Chloride   Date Value Ref Range Status   02/12/2024 104 98 - 107 mmol/L Final   02/18/2022 102 98 - 107 mmol/L Final     Carbon Dioxide (CO2)   Date Value Ref Range Status   02/12/2024 26 22 - 29 mmol/L Final   02/18/2022 27 22 - 31 mmol/L Final     Anion Gap   Date Value Ref Range Status   02/12/2024 9 7 - 15 mmol/L Final   02/18/2022 8 5 - 18 mmol/L Final     Glucose   Date Value Ref Range Status   02/12/2024 122 (H) 70 - 99 mg/dL Final   02/18/2022 160 (H) 70 - 125 mg/dL Final     GLUCOSE BY METER POCT   Date Value Ref Range Status   02/12/2024 123 (H) 70 - 99 mg/dL Final     Urea Nitrogen   Date Value Ref Range Status   02/12/2024 11.1 6.0 - 20.0 mg/dL Final   02/18/2022 10 8 - 22 mg/dL Final     Creatinine   Date Value Ref Range Status   02/12/2024 0.74 0.67 - 1.17 mg/dL Final     GFR Estimate   Date Value Ref Range Status   02/12/2024 >90 >60 mL/min/1.73m2 Final   03/05/2021 >60 >60 mL/min/1.73m2 Final     Calcium   Date Value Ref Range Status   02/12/2024  "9.1 8.6 - 10.0 mg/dL Final       CRP   Date Value Ref Range Status   05/07/2020 0.4 0.0 - 0.8 mg/dL Final        The following microbiology studies were personally reviewed:  No results found for: \"CULT\"    Urine Studies  No lab results found.    Vancomycin Levels  No lab results found.    Invalid input(s): \"VANCO\"    MICROBIOLOGY DATA:    All cultures:  7-Day Micro Results       Collected Updated Procedure Result Status      02/12/2024 1055 02/12/2024 1138 Anaerobic Bacterial Culture Routine [[id number]]   Swab from Thumb, Left    In process Component Value   No component results               02/12/2024 1055 02/12/2024 1138 Gram Stain [[id number]]   Swab from Thumb, Left    In process Component Value   No component results            02/12/2024 1055 02/12/2024 1138 Swab Aerobic Bacterial Culture Routine [[id number]]   Swab from Thumb, Left    In process Component Value   No component results               02/12/2024 1045 02/12/2024 1138 Anaerobic Bacterial Culture Routine [[id number]]   Swab from Thumb, Left    In process Component Value   No component results               02/12/2024 1045 02/12/2024 1138 Gram Stain [[id number]]   Swab from Thumb, Left    In process Component Value   No component results            02/12/2024 1045 02/12/2024 1138 Swab Aerobic Bacterial Culture Routine [[id number]]   Swab from Thumb, Left    In process Component Value   No component results               02/11/2024 2323 02/12/2024 1132 MRSA MSSA PCR, Nasal Swab [[id number]]    Swab from Nares, Bilateral    Final result Component Value   MRSA Target DNA Negative   SA Target DNA Negative                     Pertinent Radiology  personally reviewed.     XR Finger Left G/E 2 Views    Result Date: 2/11/2024  EXAM: XR FINGER LEFT G/E 2 VIEWS LOCATION: [facility] DATE: 2/11/2024 INDICATION: left thumb pain, trauma, non healing wound COMPARISON: None.     IMPRESSION: Mild degenerative changes and " spurring at the IP joint of the left thumb. Exam otherwise negative. No evidence for fracture. No findings for osteomyelitis.

## 2024-02-12 NOTE — H&P
[facility] MEDICINE ADMISSION HISTORY AND PHYSICAL       Assessment & Plan      Present on Admission (POA)    1. Cellulitis, left thumb, by IP joint - due to injury 8 days ago. Given cephalexin and not better.  Not septic. R/O septic arthritis, osteomyelitis.    - IVF, NPO, pain meds, CMS checks  - Was given clindamycin in ED, continue for now, check MRSA  - Will add GNB coverage including pseudomonas -- Levaquin  - Ortho ref  - CBC/CMP in AM   - XR of left thumb   - did try vicodin and tylenol in the past - OK     2. Pre-op evaluation - Denies chest pain or shortness of breath. Denies history of CAD, CHF, severe valvular heart disease, or life threatening arrythmmia.     Functional capacity - walks 10K steps every day, not sedentary.     Last surgery - 2023 - left olecranon bursectomy - did OK    Based on RCRI, patient has low cardiac risk for contemplated orthopedic procedure. We did talk about risks including but not limited to: SCD, AMI, or CVA.     He is on metoprolol. Also on ASA - may need to hold depends on surgeon.  May need to hold ARBs  Check EKG     Chronic Medical Conditions    1. HTN and HL - may need to hold some BP meds   2. DM2 - hold DM meds while NPO   3. Depressionn   4. GARETT on CPAP  5. Ashtma - stable  5. Chronic LBP       VTE prophylaxis --  SCDs, if appropriate, add SQH  Diet -- NPO  Code Status -- Full,   Barriers to discharge -- Admitting/Acute medical condition/s  Discharge Disposition and goals --  Unable to determine at this point, pending progress/treatment response.     PPE - I was wearing PPE including but not limited to - N95 mask, Gloves, and/or Safety glasses.  Admission Status --  OBS     Care plan is based on available information and patient's condition at encounter. Care plan was discussed and agreed to proceed by the patient/family member. Made aware that care plan may change.     I recommend to review/revise history/care plan for information/results  not available during my encounter. All or some home medication/s were not resumed or was modified on admission due to safety concerns. Will have rounding AM doc  review safety to resume held/modified home medications.     Availability -- If need to coordinate care after night shift  -- Contact assigned AM rounding Hospitalist.     75 minutes spent by me on the date of service doing chart review, history, exam, diagnostic test results interpretation, care coordination with RN, RT and/or Pharm, & further activities per the note.      Young Bah MD, MPH, FACP, [facility]  Internal Medicine - Hospitalist        Chief Complaint Wound      HISTORY     - Patient was seen in ED - 7. He is a  for 3M    - He is here with wound on left thumb.  He injured/cut himself 8 days ago. Did get better, but injured again a few days  ago. Now with worsening swelling and pain on thumb, with superficial ulceration on dorsum part.    - Was seen in the clinic, given cephalexin. Seems not better. He decided to come. No fevers Reported some pus came out from wound.     - He is diabetic.      - ED course/treatments/referral - WBC is felipe. Was referred to Hand surgery - plan for OR. He was given clindamycin,      - ROS --- No headache. No dizziness. No weakness. No CP or SOB. No palpitations. No abdominal pain. No nausea or vomiting. No urinary symptoms. No bleeding symptoms. No weight loss. Rest of 12 point ROS was reviewed and negative.       Past Medical History     History reviewed. No pertinent past medical history.      Surgical History     Past Surgical History:   Procedure Laterality Date    KNEE SURGERY          Family History      History reviewed. No pertinent family history.      Social History      .  Social History     Socioeconomic History    Marital status:      Spouse name: Not on file    Number of children: Not on file    Years of education: Not on file    Highest education level: Not on file   Occupational  History    Not on file   Tobacco Use    Smoking status: Never    Smokeless tobacco: Not on file   Substance and Sexual Activity    Alcohol use: Yes     Alcohol/week: 21.0 standard drinks of alcohol    Drug use: Not on file    Sexual activity: Not on file   Other Topics Concern    Not on file   Social History Narrative    Not on file     Social Determinants of Health     Financial Resource Strain: Not on file   Food Insecurity: Not on file   Transportation Needs: Not on file   Physical Activity: Not on file   Stress: Not on file   Social Connections: Not on file   Interpersonal Safety: Not on file   Housing Stability: Not on file          Allergies        Allergies   Allergen Reactions    Penicillins Unknown     Loss of airway    Percocet [Oxycodone-Acetaminophen] Unknown     nausea    Sulfa (Sulfonamide Antibiotics) [Sulfa Antibiotics] Unknown     hives         Prior to Admission Medications      No current facility-administered medications on file prior to encounter.  No current outpatient medications on file prior to encounter.          Review of Systems     A 12 point comprehensive review of systems was negative except as noted above in HPI.    PHYSICAL EXAMINATION       Vitals      Vitals: BP (!) 155/85   Pulse 100   Temp 97.9  F (36.6  C) (Temporal)   Resp 19   Ht 1.829 m (6')   Wt 118.8 kg (262 lb)   SpO2 95%   BMI 35.53 kg/m    BMI= Body mass index is 35.53 kg/m .      Examination     General Appearance:  Alert, cooperative, no distress  Head:    Normocephalic, without obvious abnormality, atraumatic  EENT:  PERRL, conjunctiva/corneas clear, EOM's intact.   Neck:   Supple, symmetrical, trachea midline, no adenopathy; no NVE  Back:  Symmetric, no curvature, no CVA tenderness  Chest/Lungs: Clear to auscultation bilaterally, respirations unlabored, No tenderness or deformity. No abdominal breathing or use of accessory muscles.   Heart:    Regular rate and rhythm, S1 and S2 normal, no murmur, rub   or  gallop  Abdomen: Soft, non-tender, bowel sounds active all four quadrants, not peritoneal on palpation. Not distended  Extremities:  Extremities normal, atraumatic, no swelling   Skin:  redness, pain and swelling, left thumb, IP joint, with superficial ulcerations   Neurologic:  Awake and alert, No lateralizing or localizing signs       Pertinent Lab     Results for orders placed or performed during the hospital encounter of 02/11/24   CBC (+ platelets, no diff)   Result Value Ref Range    WBC Count 9.9 4.0 - 11.0 10e3/uL    RBC Count 4.06 (L) 4.40 - 5.90 10e6/uL    Hemoglobin 12.6 (L) 13.3 - 17.7 g/dL    Hematocrit 35.7 (L) 40.0 - 53.0 %    MCV 88 78 - 100 fL    MCH 31.0 26.5 - 33.0 pg    MCHC 35.3 31.5 - 36.5 g/dL    RDW 13.4 10.0 - 15.0 %    Platelet Count 189 150 - 450 10e3/uL   Result Value Ref Range    CRP Inflammation 79.30 (H) <5.00 mg/L           Pertinent Radiology

## 2024-02-12 NOTE — PLAN OF CARE
PRIMARY DIAGNOSIS: Laceration of left thumb with infection  OUTPATIENT/OBSERVATION GOALS TO BE MET BEFORE DISCHARGE:  ADLs back to baseline: Yes    Activity and level of assistance: Ambulating independently.    Pain status: Improved-controlled with oral pain medications.    Return to near baseline physical activity: Yes     Discharge Planner Nurse   Safe discharge environment identified: No  Barriers to discharge: No       Entered by: June Thomas, RN      Please review provider order for any additional goals.   Nurse to notify provider when observation goals have been met and patient is ready for discharge.    Pt A&Ox4, VSS, RA, Afebrile.

## 2024-02-12 NOTE — ANESTHESIA POSTPROCEDURE EVALUATION
Patient: Mo Aleman    Procedure: Procedure(s):  IRRIGATION AND DEBRIDEMENT, LEFT THUMB       Anesthesia Type:  MAC    Note:  Disposition: Inpatient   Postop Pain Control: Uneventful            Sign Out: Well controlled pain   PONV: No   Neuro/Psych: Uneventful            Sign Out: Acceptable/Baseline neuro status   Airway/Respiratory: Uneventful            Sign Out: Acceptable/Baseline resp. status   CV/Hemodynamics: Uneventful            Sign Out: Acceptable CV status; No obvious hypovolemia; No obvious fluid overload   Other NRE: NONE   DID A NON-ROUTINE EVENT OCCUR? No           Last vitals:  Vitals:    02/12/24 0825 02/12/24 0914 02/12/24 1125   BP: (!) 150/82 (!) 151/85 (!) 143/78   Pulse: 90 88 86   Resp: 18 18 16   Temp: 36.7  C (98.1  F)  36.6  C (97.8  F)   SpO2: 96% 98% 93%       Electronically Signed By: Gabriel Aquino MD  February 12, 2024  11:47 AM

## 2024-02-12 NOTE — ANESTHESIA CARE TRANSFER NOTE
Patient: Mo Aleman    Procedure: Procedure(s):  IRRIGATION AND DEBRIDEMENT, LEFT THUMB       Diagnosis: Laceration of left thumb with infection, initial encounter [S61.012A, L08.9]  Diagnosis Additional Information: No value filed.    Anesthesia Type:   MAC     Note:    Oropharynx: oropharynx clear of all foreign objects and spontaneously breathing  Level of Consciousness: awake  Oxygen Supplementation: room air    Independent Airway: airway patency satisfactory and stable  Dentition: dentition unchanged  Vital Signs Stable: post-procedure vital signs reviewed and stable  Report to RN Given: handoff report given  Patient transferred to: Medical/Surgical Unit    Handoff Report: Identifed the Patient, Identified the Reponsible Provider, Reviewed the pertinent medical history, Discussed the surgical course, Reviewed Intra-OP anesthesia mangement and issues during anesthesia, Set expectations for post-procedure period and Allowed opportunity for questions and acknowledgement of understanding      Vitals:  Vitals Value Taken Time   /78 02/12/24 1125   Temp 36.6  C (97.8  F) 02/12/24 1125   Pulse 86 02/12/24 1125   Resp 16 02/12/24 1125   SpO2 93 % 02/12/24 1125       Electronically Signed By: MAEVE BAJWA CRNA  February 12, 2024  11:50 AM

## 2024-02-12 NOTE — UTILIZATION REVIEW
Admission Status; Secondary Review Determination   Under the authority of the Utilization Management Committee, the utilization review process indicated a secondary review on Mo Aleman. The review outcome is based on review of the medical records, discussions with staff, and applying clinical experience noted on the date of the review.   (x) Inpatient Status Appropriate - This patient's medical care is consistent with medical management for inpatient care and reasonable inpatient medical practice.     RATIONALE FOR DETERMINATION   Mo Aleman is a 54 yr old male with HTN, DM2, GARETT who presented with painful thumb with purulence and limited movement.  I/D confirmed septic arthritis.  Ongoing IV abx with cultures in progress.      At the time of admission with the information available to the attending physician more than 2 nights Hospital complex care was anticipated, based on patient risk of adverse outcome if treated as outpatient and complex care required. Inpatient admission is appropriate based on the Medicare guidelines.   The information on this document is developed by the utilization review team in order for the business office to ensure compliance. This only denotes the appropriateness of proper admission status and does not reflect the quality of care rendered.   The definitions of Inpatient Status and Observation Status used in making the determination above are those provided in the CMS Coverage Manual, Chapter 1 and Chapter 6, section 70.4.   Sincerely,   Mily Melendez MD  Utilization Review  Physician Advisor  [facility]

## 2024-02-12 NOTE — ANESTHESIA PREPROCEDURE EVALUATION
Anesthesia Pre-Procedure Evaluation    Patient: Mo Aleman   MRN: [id number] : 1970        Procedure : Procedure(s):  IRRIGATION AND DEBRIDEMENT, LEFT THUMB          History reviewed. No pertinent past medical history.   Past Surgical History:   Procedure Laterality Date    KNEE SURGERY        Allergies   Allergen Reactions    Penicillins Unknown     Loss of airway    Percocet [Oxycodone-Acetaminophen] Unknown     nausea    Sulfa (Sulfonamide Antibiotics) [Sulfa Antibiotics] Unknown     hives      Social History     Tobacco Use    Smoking status: Never    Smokeless tobacco: Not on file   Substance Use Topics    Alcohol use: Yes     Alcohol/week: 21.0 standard drinks of alcohol      Wt Readings from Last 1 Encounters:   24 118.8 kg (262 lb)        Anesthesia Evaluation   Pt has had prior anesthetic. Type: Regional and General.    No history of anesthetic complications       ROS/MED HX  ENT/Pulmonary:  - neg pulmonary ROS   (+) sleep apnea, uses CPAP,                                      Neurologic:  - neg neurologic ROS     Cardiovascular:  - neg cardiovascular ROS   (+) Dyslipidemia hypertension- -   -  - -                                      METS/Exercise Tolerance: >4 METS    Hematologic:  - neg hematologic  ROS     Musculoskeletal:  - neg musculoskeletal ROS     GI/Hepatic:  - neg GI/hepatic ROS   (+) GERD, Asymptomatic on medication,                  Renal/Genitourinary:  - neg Renal ROS     Endo:  - neg endo ROS   (+)  type II DM (last trulicity 5 days ago, denies any symptoms of bloating), Last HgA1c: 7.4,  Not using insulin,          Obesity (bmi 35),       Psychiatric/Substance Use:  - neg psychiatric ROS     Infectious Disease: Comment: Left thumb infection - neg infectious disease ROS     Malignancy:  - neg malignancy ROS     Other:  - neg other ROS          Physical Exam    Airway        Mallampati: II   TM distance: > 3 FB   Neck ROM: full   Mouth opening: > 3 cm    Respiratory  "Devices and Support         Dental       (+) Minor Abnormalities - some fillings, tiny chips    B=Bridge, C=Chipped, L=Loose, M=Missing    Cardiovascular   cardiovascular exam normal          Pulmonary   pulmonary exam normal                OUTSIDE LABS:  CBC:   Lab Results   Component Value Date    WBC 8.3 02/12/2024    WBC 9.9 02/11/2024    HGB 11.6 (L) 02/12/2024    HGB 12.6 (L) 02/11/2024    HCT 33.8 (L) 02/12/2024    HCT 35.7 (L) 02/11/2024     02/12/2024     02/11/2024     BMP:   Lab Results   Component Value Date     02/12/2024     02/11/2024    POTASSIUM 4.1 02/12/2024    POTASSIUM 4.0 02/11/2024    CHLORIDE 104 02/12/2024    CHLORIDE 101 02/11/2024    CO2 26 02/12/2024    CO2 24 02/11/2024    BUN 11.1 02/12/2024    BUN 11.0 02/11/2024    CR 0.74 02/12/2024    CR 0.76 02/11/2024     (H) 02/12/2024     (H) 02/12/2024     COAGS: No results found for: \"PTT\", \"INR\", \"FIBR\"  POC: No results found for: \"BGM\", \"HCG\", \"HCGS\"  HEPATIC:   Lab Results   Component Value Date    ALBUMIN 4.1 02/12/2024    PROTTOTAL 6.6 02/12/2024    ALT 20 02/12/2024    AST 19 02/12/2024    ALKPHOS 52 02/12/2024    BILITOTAL 0.6 02/12/2024     OTHER:   Lab Results   Component Value Date    A1C 7.4 (H) 12/01/2011    SANTANA 9.1 02/12/2024    MAG 1.9 02/11/2024    LIPASE 35 07/28/2021    CRP 0.4 05/07/2020       Anesthesia Plan    ASA Status:  3, emergent    NPO Status:  NPO Appropriate    Anesthesia Type: MAC.     - Reason for MAC: immobility needed, straight local not clinically adequate   Induction: Propofol.   Maintenance: TIVA.        Consents    Anesthesia Plan(s) and associated risks, benefits, and realistic alternatives discussed. Questions answered and patient/representative(s) expressed understanding.     - Discussed:     - Discussed with:  Patient            Postoperative Care    Pain management: Multi-modal analgesia.   PONV prophylaxis: Ondansetron (or other 5HT-3), Dexamethasone or " Solumedrol     Comments:    Other Comments: Discussed plan for local/mac with patient. Last trulicty 5 days ago, and he denies any symptoms of bloating or GERD. Reasonable to proceed with local/mac, with a light level of sedation in order to maintiain airway protective reflexes. Patient agrees. Postop pain block if requested by surgeon.     Reviewed anesthetic options and risks. Patient agrees to proceed.            Gabriel Aquino MD    I have reviewed the pertinent notes and labs in the chart from the past 30 days and (re)examined the patient.  Any updates or changes from those notes are reflected in this note.             # Drug Induced Platelet Defect: home medication list includes an antiplatelet medication  # Obesity: Estimated body mass index is 35.53 kg/m  as calculated from the following:    Height as of this encounter: 1.829 m (6').    Weight as of this encounter: 118.8 kg (262 lb).

## 2024-02-13 LAB
BASOPHILS # BLD AUTO: 0 10E3/UL (ref 0–0.2)
BASOPHILS NFR BLD AUTO: 0 %
EOSINOPHIL # BLD AUTO: 0.2 10E3/UL (ref 0–0.7)
EOSINOPHIL NFR BLD AUTO: 2 %
ERYTHROCYTE [DISTWIDTH] IN BLOOD BY AUTOMATED COUNT: 13.3 % (ref 10–15)
GLUCOSE BLDC GLUCOMTR-MCNC: 123 MG/DL (ref 70–99)
GLUCOSE BLDC GLUCOMTR-MCNC: 130 MG/DL (ref 70–99)
GLUCOSE BLDC GLUCOMTR-MCNC: 134 MG/DL (ref 70–99)
GLUCOSE BLDC GLUCOMTR-MCNC: 156 MG/DL (ref 70–99)
GLUCOSE BLDC GLUCOMTR-MCNC: 169 MG/DL (ref 70–99)
GLUCOSE SERPL-MCNC: 129 MG/DL (ref 70–99)
HCT VFR BLD AUTO: 34.7 % (ref 40–53)
HGB BLD-MCNC: 11.9 G/DL (ref 13.3–17.7)
IMM GRANULOCYTES # BLD: 0 10E3/UL
IMM GRANULOCYTES NFR BLD: 0 %
LYMPHOCYTES # BLD AUTO: 1.5 10E3/UL (ref 0.8–5.3)
LYMPHOCYTES NFR BLD AUTO: 19 %
MCH RBC QN AUTO: 31.1 PG (ref 26.5–33)
MCHC RBC AUTO-ENTMCNC: 34.3 G/DL (ref 31.5–36.5)
MCV RBC AUTO: 91 FL (ref 78–100)
MONOCYTES # BLD AUTO: 0.8 10E3/UL (ref 0–1.3)
MONOCYTES NFR BLD AUTO: 10 %
NEUTROPHILS # BLD AUTO: 5.4 10E3/UL (ref 1.6–8.3)
NEUTROPHILS NFR BLD AUTO: 69 %
NRBC # BLD AUTO: 0 10E3/UL
NRBC BLD AUTO-RTO: 0 /100
PLATELET # BLD AUTO: 201 10E3/UL (ref 150–450)
RBC # BLD AUTO: 3.83 10E6/UL (ref 4.4–5.9)
WBC # BLD AUTO: 7.8 10E3/UL (ref 4–11)

## 2024-02-13 PROCEDURE — 250N000011 HC RX IP 250 OP 636: Mod: JW | Performed by: INTERNAL MEDICINE

## 2024-02-13 PROCEDURE — 250N000013 HC RX MED GY IP 250 OP 250 PS 637: Performed by: INTERNAL MEDICINE

## 2024-02-13 PROCEDURE — 250N000012 HC RX MED GY IP 250 OP 636 PS 637: Performed by: INTERNAL MEDICINE

## 2024-02-13 PROCEDURE — 99232 SBSQ HOSP IP/OBS MODERATE 35: CPT | Performed by: STUDENT IN AN ORGANIZED HEALTH CARE EDUCATION/TRAINING PROGRAM

## 2024-02-13 PROCEDURE — 99232 SBSQ HOSP IP/OBS MODERATE 35: CPT | Performed by: INTERNAL MEDICINE

## 2024-02-13 PROCEDURE — 120N000001 HC R&B MED SURG/OB

## 2024-02-13 PROCEDURE — 36415 COLL VENOUS BLD VENIPUNCTURE: CPT | Performed by: STUDENT IN AN ORGANIZED HEALTH CARE EDUCATION/TRAINING PROGRAM

## 2024-02-13 PROCEDURE — 82947 ASSAY GLUCOSE BLOOD QUANT: CPT | Performed by: STUDENT IN AN ORGANIZED HEALTH CARE EDUCATION/TRAINING PROGRAM

## 2024-02-13 PROCEDURE — 258N000003 HC RX IP 258 OP 636: Performed by: INTERNAL MEDICINE

## 2024-02-13 PROCEDURE — 250N000013 HC RX MED GY IP 250 OP 250 PS 637: Performed by: PHYSICIAN ASSISTANT

## 2024-02-13 PROCEDURE — 85025 COMPLETE CBC W/AUTO DIFF WBC: CPT | Performed by: PHYSICIAN ASSISTANT

## 2024-02-13 RX ADMIN — MONTELUKAST SODIUM 10 MG: 10 TABLET, COATED ORAL at 22:00

## 2024-02-13 RX ADMIN — CHOLECALCIFEROL TAB 125 MCG (5000 UNIT) 5000 UNITS: 125 TAB at 08:16

## 2024-02-13 RX ADMIN — SENNOSIDES AND DOCUSATE SODIUM 1 TABLET: 8.6; 5 TABLET ORAL at 20:07

## 2024-02-13 RX ADMIN — METOPROLOL TARTRATE 25 MG: 25 TABLET, FILM COATED ORAL at 08:16

## 2024-02-13 RX ADMIN — CEFTRIAXONE SODIUM 2 G: 2 INJECTION, POWDER, FOR SOLUTION INTRAMUSCULAR; INTRAVENOUS at 13:35

## 2024-02-13 RX ADMIN — ACETAMINOPHEN 650 MG: 325 TABLET ORAL at 08:15

## 2024-02-13 RX ADMIN — CYANOCOBALAMIN TAB 1000 MCG 1000 MCG: 1000 TAB at 08:17

## 2024-02-13 RX ADMIN — AMLODIPINE BESYLATE 10 MG: 10 TABLET ORAL at 08:16

## 2024-02-13 RX ADMIN — THERA TABS 1 TABLET: TAB at 08:15

## 2024-02-13 RX ADMIN — Medication 400 UNITS: at 08:17

## 2024-02-13 RX ADMIN — DAPTOMYCIN 750 MG: 500 INJECTION, POWDER, LYOPHILIZED, FOR SOLUTION INTRAVENOUS at 14:20

## 2024-02-13 RX ADMIN — INSULIN ASPART 1 UNITS: 100 INJECTION, SOLUTION INTRAVENOUS; SUBCUTANEOUS at 12:06

## 2024-02-13 RX ADMIN — FEXOFENADINE HYDROCHLORIDE 60 MG: 60 TABLET ORAL at 08:17

## 2024-02-13 RX ADMIN — ACETAMINOPHEN 650 MG: 325 TABLET ORAL at 19:44

## 2024-02-13 RX ADMIN — ACETAMINOPHEN 650 MG: 325 TABLET ORAL at 13:36

## 2024-02-13 RX ADMIN — METOPROLOL TARTRATE 25 MG: 25 TABLET, FILM COATED ORAL at 20:07

## 2024-02-13 RX ADMIN — FEXOFENADINE HYDROCHLORIDE 60 MG: 60 TABLET ORAL at 20:07

## 2024-02-13 RX ADMIN — PANTOPRAZOLE SODIUM 40 MG: 40 TABLET, DELAYED RELEASE ORAL at 08:15

## 2024-02-13 RX ADMIN — Medication 90 MG: at 08:16

## 2024-02-13 RX ADMIN — PRAZOSIN HYDROCHLORIDE 2 MG: 2 CAPSULE ORAL at 08:16

## 2024-02-13 RX ADMIN — VENLAFAXINE HYDROCHLORIDE 150 MG: 150 CAPSULE, EXTENDED RELEASE ORAL at 08:16

## 2024-02-13 RX ADMIN — PRAZOSIN HYDROCHLORIDE 2 MG: 2 CAPSULE ORAL at 14:20

## 2024-02-13 RX ADMIN — ASPIRIN 81 MG: 81 TABLET, COATED ORAL at 08:16

## 2024-02-13 RX ADMIN — FOLIC ACID 1 MG: 1 TABLET ORAL at 08:15

## 2024-02-13 RX ADMIN — SENNOSIDES AND DOCUSATE SODIUM 1 TABLET: 8.6; 5 TABLET ORAL at 08:15

## 2024-02-13 RX ADMIN — PRAZOSIN HYDROCHLORIDE 2 MG: 2 CAPSULE ORAL at 20:07

## 2024-02-13 RX ADMIN — PANTOPRAZOLE SODIUM 40 MG: 40 TABLET, DELAYED RELEASE ORAL at 18:11

## 2024-02-13 RX ADMIN — HYDROCHLOROTHIAZIDE 12.5 MG: 12.5 CAPSULE ORAL at 08:17

## 2024-02-13 RX ADMIN — HYDROCODONE BITARTRATE AND ACETAMINOPHEN 2 TABLET: 5; 325 TABLET ORAL at 02:01

## 2024-02-13 RX ADMIN — ATORVASTATIN CALCIUM 80 MG: 40 TABLET, FILM COATED ORAL at 22:00

## 2024-02-13 ASSESSMENT — ACTIVITIES OF DAILY LIVING (ADL)
DEPENDENT_IADLS:: INDEPENDENT
ADLS_ACUITY_SCORE: 22

## 2024-02-13 NOTE — PROGRESS NOTES
"Orthopedic Progress Note      Assessment: 1 Day Post-Op  S/P Procedure(s):  IRRIGATION AND DEBRIDEMENT, LEFT THUMB @    Plan:   - Continue PT/OT  - Weightbearing status: WBAT to the left hand  - Anticoagulation: per medicine in addition to SCDs, alise stockings and early ambulation.  - 3x daily warm water soaks  - Pull pen kary drain this afternoon/evening  - ID following, culture with NGTD  - Discharge planning: pending ID plan      Subjective:  Pain: minimal  Chest pain, SOB: no  Nausea, Vomiting:  no  Lightheadedness, Dizziness:  no  Neuro:  Patient denies new onset numbness or paresthesias    Patient is doing well today with improved pain controlled with oral meds. Dressing is clean dry and intact at this time. Able to wiggle thumb.     Objective:  /79 (BP Location: Left arm, Patient Position: Semi-Ross's, Cuff Size: Adult Regular)   Pulse 87   Temp 98.5  F (36.9  C) (Oral)   Resp 16   Ht 1.829 m (6')   Wt 118.8 kg (262 lb)   SpO2 95%   BMI 35.53 kg/m    The patient is A&Ox3. Appears comfortable.   Sensation is intact.  Able to make full fist, wiggle thumb and has full sensation  Radial pulse intact.  Calves are soft and non-tender. Negative Quan's.  The incision is covered. Dressing C/D/I.    Pertinent Labs   Lab Results: personally reviewed.   No results found for: \"INR\", \"PROTIME\"  Lab Results   Component Value Date    WBC 7.8 02/13/2024    HGB 11.9 (L) 02/13/2024    HCT 34.7 (L) 02/13/2024    MCV 91 02/13/2024     02/13/2024     Lab Results   Component Value Date     02/12/2024    CO2 26 02/12/2024         Report completed by:  Shanelle Garcia PA-C/Dr. [facility]    Date: 2/13/2024  Time: 9:55 AM    "

## 2024-02-13 NOTE — CONSULTS
Care Management Initial Consult    General Information  Assessment completed with: Patient,    Type of CM/SW Visit: Initial Assessment    Primary Care Provider verified and updated as needed: Yes   Readmission within the last 30 days: no previous admission in last 30 days      Reason for Consult: discharge planning  Advance Care Planning: Advance Care Planning Reviewed: no concerns identified          Communication Assessment  Patient's communication style: spoken language (English or Bilingual)    Hearing Difficulty or Deaf: yes   Wear Glasses or Blind: yes    Cognitive  Cognitive/Neuro/Behavioral: WDL  Level of Consciousness: alert  Arousal Level: opens eyes spontaneously  Orientation: oriented x 4             Living Environment:   People in home: spouse     Current living Arrangements: house      Able to return to prior arrangements: yes       Family/Social Support:  Care provided by: self  Provides care for: no one  Marital Status:   Wife          Description of Support System: Supportive, Involved         Current Resources:   Patient receiving home care services: No     Community Resources: None  Equipment currently used at home: none  Supplies currently used at home: None    Employment/Financial:  Employment Status: employed full-time        Financial Concerns: none   Referral to Financial Worker: No       Does the patient's insurance plan have a 3 day qualifying hospital stay waiver?  No    Lifestyle & Psychosocial Needs:  Social Determinants of Health     Food Insecurity: Not on file   Depression: Not on file   Housing Stability: Not on file   Tobacco Use: Unknown (2/11/2024)    Patient History     Smoking Tobacco Use: Never     Smokeless Tobacco Use: Unknown     Passive Exposure: Not on file   Financial Resource Strain: Not on file   Alcohol Use: Not on file   Transportation Needs: Not on file   Physical Activity: Not on file   Interpersonal Safety: Not on file   Stress: Not on file   Social  Connections: Not on file       Functional Status:  Prior to admission patient needed assistance:   Dependent ADLs:: Independent  Dependent IADLs:: Independent       Mental Health Status:  Mental Health Status: No Current Concerns       Chemical Dependency Status:  Chemical Dependency Status: No Current Concerns             Values/Beliefs:  Spiritual, Cultural Beliefs, Cheondoism Practices, Values that affect care: no               Additional Information:  SWCM met and introduced self and CM services to Pt.  Pt lives in a house with his wife. Pt independent at baseline.  Pt is a  for .  CM following for possible Home IV ABX. Referral was sent to  [facility].  Waiting for cultures.  Pt drove himself to hospital and can get a ride from family if needed,     ANISHA Silverio

## 2024-02-13 NOTE — PLAN OF CARE
Problem: Infection  Goal: Absence of Infection Signs and Symptoms  Outcome: Progressing  Intervention: Prevent or Manage Infection  Flowsheets (Taken 2/13/2024 2827)  Fever Reduction/Comfort Measures: lightweight clothing     Patient receiving IV antibiotics. Awaiting plan for discharge regarding antibiotics. Pain managed with medication (see MAR). Patient wants to try and just use tylenol to manage pain. 3x daily soaks.

## 2024-02-13 NOTE — PROGRESS NOTES
Attempted to contact [facility] x2 about penrose drain being pulled and doing initial dressing change. Awaiting for call back.     1740: Received call from on call [facility] MD. Plan to try and remove penrose drain. If unable to, leave for rounding PA to remove tomorrow. Wrap in thick wrap with 4x4, kerlix, and ace wrap. After soaking, this RN was unable to remove drain, and did not see drain in water after soaking. Wrapped dressing and educated patient on dressing and soaking care.

## 2024-02-13 NOTE — PLAN OF CARE
Problem: Adult Inpatient Plan of Care  Goal: Plan of Care Review  Description: The Plan of Care Review/Shift note should be completed every shift.  The Outcome Evaluation is a brief statement about your assessment that the patient is improving, declining, or no change.  This information will be displayed automatically on your shift  note.  Outcome: Progressing     Problem: Adult Inpatient Plan of Care  Goal: Optimal Comfort and Wellbeing  Outcome: Progressing     Problem: Pain Acute  Goal: Optimal Pain Control and Function  Outcome: Progressing   Goal Outcome Evaluation:  Pt vitals stable overnight. Pain controlled with Dilaudid and Norco overnight.  Drinking and voiding well overnight. No drainage soaking through ace wrap.                       5

## 2024-02-13 NOTE — PROGRESS NOTES
[facility]    Medicine Progress Note - Hospitalist Service    Date of Admission:  2/11/2024    Assessment & Plan   54-year-old male with past medical history of hypertension, type 2 diabetes, depression, GARETT on CPAP, and asthma who to the emergency department with painful left thumb wound that was purulent and limited movement in the joint, given this orthopedics was consulted and took patient to OR on 2/12 for septic arthritis.    Changes today:  -Wound Culture + staph aureus, ID following  -Resumed Losartan, holding parameters placed  -Continue all other current management     #Laceration Left thumb  #Left thumb IP joint septic arthritis  -S/P OR on 2/12  -ID consulted, appreciate recs  -Orthopedics consulted, appreciate recs  -Ceftriaxone/Dapto per ID, OR cultures pending    #T2DM  -MDSSI  -Holding PTA oral hyperglycemics given patients hospital status    Chronic medical conditions:  HTN-Resume PTA Losartan, Amlodipine, metoprolol, and HCTZ with holding parameters  Depression-PTA Venlafaxine, prazosin  Asthma-PTA meds    Barriers to discharge:Abx plan, ortho clearance/drain        Diet: Advance Diet as Tolerated: Regular Diet Adult  Discharge Instruction - Regular Diet Adult    DVT Prophylaxis: VTE Prophylaxis contraindicated due to OR  David Catheter: Not present  Lines: None     Cardiac Monitoring: None  Code Status: Full Code      Clinically Significant Risk Factors                         # Obesity: Estimated body mass index is 35.53 kg/m  as calculated from the following:    Height as of this encounter: 1.829 m (6').    Weight as of this encounter: 118.8 kg (262 lb)., PRESENT ON ADMISSION     # Financial/Environmental Concerns: none         Disposition Plan     Expected Discharge Date: 02/14/2024      Destination: home;home with family  Discharge Comments: I&D 2/12            Harvey Gomez MD  Hospitalist Service  [facility]  Securely message with [employer]  (more info)  Text page via [facility] Paging/Directory   ______________________________________________________________________    Interval History   No acute events overnight.  Patient reports pain is much improved today tolerating p.o. intake without difficulty denies any concerns or questions.    Physical Exam   Vital Signs: Temp: 97.8  F (36.6  C) Temp src: Oral BP: (!) 141/77 Pulse: 87   Resp: 16 SpO2: 93 % O2 Device: None (Room air)    Weight: 262 lbs 0 oz    Gen: Appears well, NAD, on RA  Card:Warm well perfused, pulses assumed patient talking  Pulm: Normal I/E effort on RA  Abd:Non distended  Skin/MSK: Dressing over thumb, C/D/I otherwise no obvious  trauma/deformities   Neuro AxOx4, S/S grossly intact, no obvious FND,   Psych:Pleasant, answering questions appropriately, insight good, judgement good, does not appear to be responding to I/E stimuli     Medical Decision Making       35 MINUTES SPENT BY ME on the date of service doing chart review, history, exam, documentation & further activities per the note.      Data   ------------------------- PAST 24 HR DATA REVIEWED -----------------------------------------------    I have personally reviewed the following data over the past 24 hrs:    7.8  \   11.9 (L)   / 201     N/A N/A N/A /  169 (H)   N/A N/A N/A \       Imaging results reviewed over the past 24 hrs:   No results found for this or any previous visit (from the past 24 hour(s)).

## 2024-02-13 NOTE — PLAN OF CARE
"  Problem: Adult Inpatient Plan of Care  Goal: Plan of Care Review  Description: The Plan of Care Review/Shift note should be completed every shift.  The Outcome Evaluation is a brief statement about your assessment that the patient is improving, declining, or no change.  This information will be displayed automatically on your shift  note.  Outcome: Progressing  Goal: Patient-Specific Goal (Individualized)  Description: You can add care plan individualizations to a care plan. Examples of Individualization might be:  \"Parent requests to be called daily at 9am for status\", \"I have a hard time hearing out of my right ear\", or \"Do not touch me to wake me up as it startles  me\".  Outcome: Progressing  Goal: Absence of Hospital-Acquired Illness or Injury  Outcome: Progressing  Intervention: Identify and Manage Fall Risk  Recent Flowsheet Documentation  Taken 2/12/2024 0914 by Tashia Mathew RN  Safety Promotion/Fall Prevention:   safety round/check completed   nonskid shoes/slippers when out of bed   patient and family education   clutter free environment maintained  Intervention: Prevent Skin Injury  Recent Flowsheet Documentation  Taken 2/12/2024 1600 by Tashia Mathew RN  Body Position: sitting up in bed  Taken 2/12/2024 0914 by Tashia Mathew RN  Body Position: position changed independently  Intervention: Prevent and Manage VTE (Venous Thromboembolism) Risk  Recent Flowsheet Documentation  Taken 2/12/2024 0914 by Tashia Mathew RN  VTE Prevention/Management: SCDs (sequential compression devices) off  Intervention: Prevent Infection  Recent Flowsheet Documentation  Taken 2/12/2024 0914 by Tashia Mathew RN  Infection Prevention: hand hygiene promoted  Goal: Optimal Comfort and Wellbeing  Outcome: Progressing  Goal: Readiness for Transition of Care  Outcome: Progressing   Goal Outcome Evaluation:                  Pt went down for surgery of L thumb at 10 a.m. VSS, BP trending upwards in 160 " systolic. BP meds scheduled. BS ACHS - no coverage needed today. Voiding well. Up independently in room. L thumb wrapped with ace wrap and penrose drain underneath. Wears CPAP at night.

## 2024-02-13 NOTE — PROGRESS NOTES
INFECTIOUS DISEASE FOLLOW UP NOTE    Date: 2024   CHIEF COMPLAINT:   Chief Complaint   Patient presents with     Wound Check     Left thumb        ASSESSMENT:    1. Left thumb IP joint septic arthritis, abscess: rapid onset following cut. S/p I&D, arthrotomy , OR cultures is pending; GS no organisms.  2. Remote PCN allergy: tolerates cephalosporins (and per patient amoxicillin)      PLAN:  - continue ceftriaxone IV  - continue daptomycin IV  - follow OR cultures to determine antibiotic plan  - further recommendations to follow clinical course  - ID will follow, thanks    Sue Stein MD  [facility]   On-Call: [phone]     ______________________________________________________________________    SUBJECTIVE / INTERVAL HISTORY: thumb feels a lot better. Tolerating antibiotics. Haven't looked at thumb yet.    ROS: All other systems negative except as listed above.    SH/FH/Habits/PMH reviewed and unchanged.    OBJECTIVE:  /79 (BP Location: Left arm, Patient Position: Semi-Ross's, Cuff Size: Adult Regular)   Pulse 87   Temp 98.5  F (36.9  C) (Oral)   Resp 16   Ht 1.829 m (6')   Wt 118.8 kg (262 lb)   SpO2 95%   BMI 35.53 kg/m       Resp: 16      Vital Signs  Temp: 98.5  F (36.9  C)  Temp src: Oral  Resp: 16  Pulse: 87  Pulse Rate Source: Monitor  BP: 138/79  BP Location: Left arm    Temp (24hrs), Av.2  F (36.8  C), Min:97.8  F (36.6  C), Max:98.7  F (37.1  C)      GEN: No acute distress.    RESPIRATORY:  Normal breathing pattern.   EXTREMITIES: L hand and thumb in wraps  SKIN/HAIR/NAILS:  No rashes  IV: peripheral IV        Antibiotics:  Ceftriaxone   daptomycin    Pertinent labs:  CRP   Date Value Ref Range Status   2020 0.4 0.0 - 0.8 mg/dL Final      CBC RESULTS:   Recent Labs   Lab Test 24  0650   WBC 7.8   RBC 3.83*   HGB 11.9*   HCT 34.7*   MCV 91   MCH 31.1   MCHC 34.3   RDW 13.3         Last Comprehensive Metabolic Panel:  Sodium    Date Value Ref Range Status   02/12/2024 139 135 - 145 mmol/L Final     Comment:     Reference intervals for this test were updated on 09/26/2023 to more accurately reflect our healthy population. There may be differences in the flagging of prior results with similar values performed with this method. Interpretation of those prior results can be made in the context of the updated reference intervals.      Potassium   Date Value Ref Range Status   02/12/2024 4.1 3.4 - 5.3 mmol/L Final   02/18/2022 3.7 3.5 - 5.0 mmol/L Final     Chloride   Date Value Ref Range Status   02/12/2024 104 98 - 107 mmol/L Final   02/18/2022 102 98 - 107 mmol/L Final     Carbon Dioxide (CO2)   Date Value Ref Range Status   02/12/2024 26 22 - 29 mmol/L Final   02/18/2022 27 22 - 31 mmol/L Final     Anion Gap   Date Value Ref Range Status   02/12/2024 9 7 - 15 mmol/L Final   02/18/2022 8 5 - 18 mmol/L Final     Glucose   Date Value Ref Range Status   02/13/2024 129 (H) 70 - 99 mg/dL Final   02/18/2022 160 (H) 70 - 125 mg/dL Final     GLUCOSE BY METER POCT   Date Value Ref Range Status   02/13/2024 134 (H) 70 - 99 mg/dL Final     Urea Nitrogen   Date Value Ref Range Status   02/12/2024 11.1 6.0 - 20.0 mg/dL Final   02/18/2022 10 8 - 22 mg/dL Final     Creatinine   Date Value Ref Range Status   02/12/2024 0.74 0.67 - 1.17 mg/dL Final     GFR Estimate   Date Value Ref Range Status   02/12/2024 >90 >60 mL/min/1.73m2 Final   03/05/2021 >60 >60 mL/min/1.73m2 Final     Calcium   Date Value Ref Range Status   02/12/2024 9.1 8.6 - 10.0 mg/dL Final        MICROBIOLOGY DATA:  Personally reviewed.  7-Day Micro Results     Collected Updated Procedure Result Status      02/12/2024 1055 02/12/2024 1138 Anaerobic Bacterial Culture Routine [[id number]]   Synovial fluid from Thumb, Left    In process Component Value   No component results               02/12/2024 1055 02/12/2024 1438 Gram Stain [[id number]]   Synovial fluid from Thumb, Left    Final  result Component Value   Gram Stain Result No organisms seen   Gram Stain Result 2+ WBC seen            02/12/2024 1055 02/13/2024 0727 Synovial fluid Aerobic Bacterial Culture Routine [[id number]]    Synovial fluid from Thumb, Left    Preliminary result Component Value   Culture Culture in progress  [P]                02/12/2024 1045 02/12/2024 1138 Anaerobic Bacterial Culture Routine [[id number]]   Abscess from Thumb, Left    In process Component Value   No component results               02/12/2024 1045 02/12/2024 1456 Gram Stain [[id number]]   Abscess from Thumb, Left    Final result Component Value   Gram Stain Result No organisms seen   Gram Stain Result 4+ WBC seen   Predominantly PMNs            02/12/2024 1045 02/13/2024 0730 Abscess Aerobic Bacterial Culture Routine [[id number]]   Abscess from Thumb, Left    Preliminary result Component Value   Culture Culture in progress  [P]                02/11/2024 2323 02/12/2024 1132 MRSA MSSA PCR, Nasal Swab [[id number]]    Swab from Nares, Bilateral    Final result Component Value   MRSA Target DNA Negative   SA Target DNA Negative                   RADIOLOGY:  Personally Reviewed.  No results found for this or any previous visit (from the past 24 hour(s)).    Principal Problem:    Laceration of left thumb with infection, initial encounter

## 2024-02-13 NOTE — PROGRESS NOTES
[facility]    Referral received from Shana Kwok RN CM, for IV antibiotics.    Benefits verified. Pt has coverage for IV antibiotics under his [employer] plan.  Pt has a $650 deductible (has met $0).  Pt has 80% coverage until his $5400 out of pocket is met (has met $249.54 so far).  Once the out of pocket has been met, pt will have 100% coverage.    Should pt require IV abx at discharge, writer will speak with pt to review benefits, home infusion and to offer choice of agency/home infusion provider.    Thank you for the referral.    Dawna Vargas RN, BSN  [facility] Liaison  [phone] (Mon through Fri, 8:00 am-5:00 pm)  [phone] (Office)

## 2024-02-14 LAB
BASOPHILS # BLD AUTO: 0 10E3/UL (ref 0–0.2)
BASOPHILS NFR BLD AUTO: 0 %
CREAT SERPL-MCNC: 0.79 MG/DL (ref 0.67–1.17)
EGFRCR SERPLBLD CKD-EPI 2021: >90 ML/MIN/1.73M2
EOSINOPHIL # BLD AUTO: 0.1 10E3/UL (ref 0–0.7)
EOSINOPHIL NFR BLD AUTO: 2 %
ERYTHROCYTE [DISTWIDTH] IN BLOOD BY AUTOMATED COUNT: 13.1 % (ref 10–15)
GLUCOSE BLDC GLUCOMTR-MCNC: 125 MG/DL (ref 70–99)
GLUCOSE BLDC GLUCOMTR-MCNC: 130 MG/DL (ref 70–99)
GLUCOSE BLDC GLUCOMTR-MCNC: 154 MG/DL (ref 70–99)
GLUCOSE BLDC GLUCOMTR-MCNC: 160 MG/DL (ref 70–99)
GLUCOSE BLDC GLUCOMTR-MCNC: 239 MG/DL (ref 70–99)
GLUCOSE SERPL-MCNC: 162 MG/DL (ref 70–99)
HCT VFR BLD AUTO: 37 % (ref 40–53)
HGB BLD-MCNC: 12.8 G/DL (ref 13.3–17.7)
IMM GRANULOCYTES # BLD: 0 10E3/UL
IMM GRANULOCYTES NFR BLD: 0 %
LYMPHOCYTES # BLD AUTO: 1.7 10E3/UL (ref 0.8–5.3)
LYMPHOCYTES NFR BLD AUTO: 22 %
MCH RBC QN AUTO: 31.2 PG (ref 26.5–33)
MCHC RBC AUTO-ENTMCNC: 34.6 G/DL (ref 31.5–36.5)
MCV RBC AUTO: 90 FL (ref 78–100)
MONOCYTES # BLD AUTO: 0.7 10E3/UL (ref 0–1.3)
MONOCYTES NFR BLD AUTO: 10 %
NEUTROPHILS # BLD AUTO: 5 10E3/UL (ref 1.6–8.3)
NEUTROPHILS NFR BLD AUTO: 66 %
NRBC # BLD AUTO: 0 10E3/UL
NRBC BLD AUTO-RTO: 0 /100
PLATELET # BLD AUTO: 245 10E3/UL (ref 150–450)
RBC # BLD AUTO: 4.1 10E6/UL (ref 4.4–5.9)
WBC # BLD AUTO: 7.5 10E3/UL (ref 4–11)

## 2024-02-14 PROCEDURE — 250N000013 HC RX MED GY IP 250 OP 250 PS 637: Performed by: INTERNAL MEDICINE

## 2024-02-14 PROCEDURE — 85025 COMPLETE CBC W/AUTO DIFF WBC: CPT | Performed by: PHYSICIAN ASSISTANT

## 2024-02-14 PROCEDURE — 999N000157 HC STATISTIC RCP TIME EA 10 MIN

## 2024-02-14 PROCEDURE — 258N000003 HC RX IP 258 OP 636: Performed by: INTERNAL MEDICINE

## 2024-02-14 PROCEDURE — 99232 SBSQ HOSP IP/OBS MODERATE 35: CPT | Performed by: STUDENT IN AN ORGANIZED HEALTH CARE EDUCATION/TRAINING PROGRAM

## 2024-02-14 PROCEDURE — 36415 COLL VENOUS BLD VENIPUNCTURE: CPT | Performed by: PHYSICIAN ASSISTANT

## 2024-02-14 PROCEDURE — 250N000011 HC RX IP 250 OP 636: Performed by: STUDENT IN AN ORGANIZED HEALTH CARE EDUCATION/TRAINING PROGRAM

## 2024-02-14 PROCEDURE — 5A09357 ASSISTANCE WITH RESPIRATORY VENTILATION, LESS THAN 24 CONSECUTIVE HOURS, CONTINUOUS POSITIVE AIRWAY PRESSURE: ICD-10-PCS | Performed by: STUDENT IN AN ORGANIZED HEALTH CARE EDUCATION/TRAINING PROGRAM

## 2024-02-14 PROCEDURE — 94660 CPAP INITIATION&MGMT: CPT

## 2024-02-14 PROCEDURE — 250N000013 HC RX MED GY IP 250 OP 250 PS 637: Performed by: STUDENT IN AN ORGANIZED HEALTH CARE EDUCATION/TRAINING PROGRAM

## 2024-02-14 PROCEDURE — 250N000011 HC RX IP 250 OP 636: Mod: JZ | Performed by: INTERNAL MEDICINE

## 2024-02-14 PROCEDURE — 82565 ASSAY OF CREATININE: CPT | Performed by: STUDENT IN AN ORGANIZED HEALTH CARE EDUCATION/TRAINING PROGRAM

## 2024-02-14 PROCEDURE — 99232 SBSQ HOSP IP/OBS MODERATE 35: CPT | Performed by: INTERNAL MEDICINE

## 2024-02-14 PROCEDURE — 82947 ASSAY GLUCOSE BLOOD QUANT: CPT | Performed by: STUDENT IN AN ORGANIZED HEALTH CARE EDUCATION/TRAINING PROGRAM

## 2024-02-14 PROCEDURE — 120N000001 HC R&B MED SURG/OB

## 2024-02-14 RX ORDER — ENOXAPARIN SODIUM 100 MG/ML
40 INJECTION SUBCUTANEOUS EVERY 24 HOURS
Status: DISCONTINUED | OUTPATIENT
Start: 2024-02-14 | End: 2024-02-15 | Stop reason: HOSPADM

## 2024-02-14 RX ADMIN — ACETAMINOPHEN 650 MG: 325 TABLET ORAL at 04:55

## 2024-02-14 RX ADMIN — METOPROLOL TARTRATE 25 MG: 25 TABLET, FILM COATED ORAL at 08:30

## 2024-02-14 RX ADMIN — PRAZOSIN HYDROCHLORIDE 2 MG: 2 CAPSULE ORAL at 14:32

## 2024-02-14 RX ADMIN — THERA TABS 1 TABLET: TAB at 08:28

## 2024-02-14 RX ADMIN — INSULIN ASPART 1 UNITS: 100 INJECTION, SOLUTION INTRAVENOUS; SUBCUTANEOUS at 18:33

## 2024-02-14 RX ADMIN — FLUTICASONE PROPIONATE 2 SPRAY: 50 SPRAY, METERED NASAL at 08:29

## 2024-02-14 RX ADMIN — LOSARTAN POTASSIUM 100 MG: 50 TABLET, FILM COATED ORAL at 08:28

## 2024-02-14 RX ADMIN — Medication 90 MG: at 08:29

## 2024-02-14 RX ADMIN — HYDROCHLOROTHIAZIDE 12.5 MG: 12.5 CAPSULE ORAL at 08:30

## 2024-02-14 RX ADMIN — CYANOCOBALAMIN TAB 1000 MCG 1000 MCG: 1000 TAB at 08:30

## 2024-02-14 RX ADMIN — ATORVASTATIN CALCIUM 80 MG: 40 TABLET, FILM COATED ORAL at 22:24

## 2024-02-14 RX ADMIN — ASPIRIN 81 MG: 81 TABLET, COATED ORAL at 08:28

## 2024-02-14 RX ADMIN — PRAZOSIN HYDROCHLORIDE 2 MG: 2 CAPSULE ORAL at 08:31

## 2024-02-14 RX ADMIN — CEFTRIAXONE SODIUM 2 G: 2 INJECTION, POWDER, FOR SOLUTION INTRAMUSCULAR; INTRAVENOUS at 13:09

## 2024-02-14 RX ADMIN — ACETAMINOPHEN 650 MG: 325 TABLET ORAL at 14:30

## 2024-02-14 RX ADMIN — DAPTOMYCIN 750 MG: 500 INJECTION, POWDER, LYOPHILIZED, FOR SOLUTION INTRAVENOUS at 14:27

## 2024-02-14 RX ADMIN — MONTELUKAST SODIUM 10 MG: 10 TABLET, COATED ORAL at 22:25

## 2024-02-14 RX ADMIN — PRAZOSIN HYDROCHLORIDE 2 MG: 2 CAPSULE ORAL at 22:25

## 2024-02-14 RX ADMIN — FEXOFENADINE HYDROCHLORIDE 60 MG: 60 TABLET ORAL at 08:30

## 2024-02-14 RX ADMIN — INSULIN ASPART 2 UNITS: 100 INJECTION, SOLUTION INTRAVENOUS; SUBCUTANEOUS at 08:37

## 2024-02-14 RX ADMIN — AMLODIPINE BESYLATE 10 MG: 10 TABLET ORAL at 08:36

## 2024-02-14 RX ADMIN — ENOXAPARIN SODIUM 40 MG: 100 INJECTION SUBCUTANEOUS at 22:38

## 2024-02-14 RX ADMIN — PANTOPRAZOLE SODIUM 40 MG: 40 TABLET, DELAYED RELEASE ORAL at 16:07

## 2024-02-14 RX ADMIN — METOPROLOL TARTRATE 25 MG: 25 TABLET, FILM COATED ORAL at 22:25

## 2024-02-14 RX ADMIN — Medication 400 UNITS: at 08:29

## 2024-02-14 RX ADMIN — VENLAFAXINE HYDROCHLORIDE 150 MG: 150 CAPSULE, EXTENDED RELEASE ORAL at 08:30

## 2024-02-14 RX ADMIN — FOLIC ACID 1 MG: 1 TABLET ORAL at 08:28

## 2024-02-14 RX ADMIN — PANTOPRAZOLE SODIUM 40 MG: 40 TABLET, DELAYED RELEASE ORAL at 08:28

## 2024-02-14 RX ADMIN — CHOLECALCIFEROL TAB 125 MCG (5000 UNIT) 5000 UNITS: 125 TAB at 08:28

## 2024-02-14 RX ADMIN — FEXOFENADINE HYDROCHLORIDE 60 MG: 60 TABLET ORAL at 22:24

## 2024-02-14 ASSESSMENT — ACTIVITIES OF DAILY LIVING (ADL)
ADLS_ACUITY_SCORE: 22

## 2024-02-14 NOTE — PROGRESS NOTES
"Orthopedic Progress Note      Assessment: 2 Day Post-Op  S/P Procedure(s):  IRRIGATION AND DEBRIDEMENT, LEFT THUMB @    Plan:   - Continue PT/OT  - Weightbearing status: WBAT to the left hand  - Anticoagulation: per medicine in addition to SCDs, alise stockings and early ambulation.  - 3x daily warm water soaks  - ID following, culture with NGTD  - Discharge planning: Can discharge from hand standpoint when cleared by ID      Subjective:  Pain: minimal  Chest pain, SOB: no  Nausea, Vomiting:  no  Lightheadedness, Dizziness:  no  Neuro:  Patient denies new onset numbness or paresthesias    Patient is doing well today with improved pain controlled with oral meds. Dressing is clean dry and intact at this time. Able to wiggle thumb.     Objective:  BP (!) 140/76 (BP Location: Right arm, Patient Position: Semi-Ross's)   Pulse 91   Temp 98.4  F (36.9  C) (Oral)   Resp 17   Ht 1.829 m (6')   Wt 118.8 kg (262 lb)   SpO2 94%   BMI 35.53 kg/m    The patient is A&Ox3. Appears comfortable.   Sensation is intact.  Able to make full fist, wiggle thumb and has full sensation  Radial pulse intact.  Calves are soft and non-tender. Negative Quan's.  The incision is covered. Dressing C/D/I. Drain pulled    Pertinent Labs   Lab Results: personally reviewed.   No results found for: \"INR\", \"PROTIME\"  Lab Results   Component Value Date    WBC 7.5 02/14/2024    HGB 12.8 (L) 02/14/2024    HCT 37.0 (L) 02/14/2024    MCV 90 02/14/2024     02/14/2024     Lab Results   Component Value Date     02/12/2024    CO2 26 02/12/2024         Report completed by:  Shanelle Garcia PA-C/[facility]    02/14/24     "

## 2024-02-14 NOTE — PLAN OF CARE
Problem: Adult Inpatient Plan of Care  Goal: Plan of Care Review  Description: The Plan of Care Review/Shift note should be completed every shift.  The Outcome Evaluation is a brief statement about your assessment that the patient is improving, declining, or no change.  This information will be displayed automatically on your shift  note.  Outcome: Progressing     Problem: Adult Inpatient Plan of Care  Goal: Optimal Comfort and Wellbeing  Outcome: Progressing     Problem: Pain Acute  Goal: Optimal Pain Control and Function  Outcome: Progressing     Problem: Infection  Goal: Absence of Infection Signs and Symptoms  Outcome: Progressing

## 2024-02-14 NOTE — PROGRESS NOTES
[facility] MEDICINE  PROGRESS NOTE       Securely message me with Alirio (more info)    Code Status: Full Code  Procedure(s):  IRRIGATION AND DEBRIDEMENT, LEFT THUMB  2 Days Post-Op  54-year-old male with past medical history of hypertension, type 2 diabetes, depression, GARETT on CPAP, and asthma who to the emergency department with painful left thumb wound that was purulent and limited movement in the joint, given this orthopedics was consulted and took patient to OR on 2/12 for septic arthritis.     2/13 staph aureas growing  2/14 Staph capitis growing     Likely discharge on 2/15-16 with oral abx plan    #Laceration Left thumb  #Left thumb IP joint septic arthritis  -S/P OR on 2/12  -ID consulted, appreciate recs, on IV daptomycin and ceftriaxone  -Orthopedics consulted, appreciate recs  -Ceftriaxone/Dapto per ID, OR cultures pending     #T2DM  -MDSSI  -Holding PTA oral hyperglycemics given patients hospital status     Chronic medical conditions:  HTN-Resume PTA Losartan, Amlodipine, metoprolol, and HCTZ with holding parameters  Depression-PTA Venlafaxine, prazosin  Asthma-PTA meds          Diet: Advance Diet as Tolerated: Regular Diet Adult  Discharge Instruction - Regular Diet Adult    DVT Prophylaxis: VTE Prophylaxis contraindicated due to OR  David Catheter: Not present  Lines: None     Cardiac Monitoring: None  Code Status: Full Code        Therapy:   David:Not present  Lines: None       Current Diet  Orders Placed This Encounter      Advance Diet as Tolerated: Regular Diet Adult      Discharge Instruction - Regular Diet Adult        Barriers to discharge:Abx plan    Disposition: inpatient    Clinically Significant Risk Factors                         # Obesity: Estimated body mass index is 35.53 kg/m  as calculated from the following:    Height as of this encounter: 1.829 m (6').    Weight as of this encounter: 118.8 kg (262 lb)., PRESENT ON ADMISSION     #  Financial/Environmental Concerns: none         Interval History/Subjective:  He feels good. Not much discharge so drain is removed by ortho. Wants to go home.      Last 24H PRN:     acetaminophen (TYLENOL) tablet 650 mg, 650 mg at 02/14/24 0455    Physical Exam/Objective:  Temp:  [97.8  F (36.6  C)-98.4  F (36.9  C)] 98.4  F (36.9  C)  Pulse:  [83-93] 91  Resp:  [16-17] 17  BP: (140-166)/(69-88) 140/76  SpO2:  [93 %-97 %] 94 %  Wt Readings from Last 4 Encounters:   02/12/24 118.8 kg (262 lb)     Body mass index is 35.53 kg/m .    General Appearance: Alert and wake, not in distress  Neurology: oriented x 3  Psych: cooperative and calm, normal affect  Skin: suture on L thumb, mildly swelling and erythematous    Medications:   Personally Reviewed.  Medications    sodium chloride 125 mL/hr at 02/12/24 0713      amLODIPine  10 mg Oral Daily    And    losartan  100 mg Oral Daily    aspirin  81 mg Oral Daily    atorvastatin  80 mg Oral At Bedtime    cefTRIAXone  2 g Intravenous Q24H    cyanocobalamin  1,000 mcg Oral Daily    DAPTOmycin (CUBICIN) 750 mg in sodium chloride 0.9 % 100 mL intermittent infusion  8 mg/kg (Adjusted) Intravenous Q24H    ferrous sulfate  90 mg Oral Daily    fexofenadine  60 mg Oral BID    fluticasone  2 spray Both Nostrils Daily    folic acid  1 mg Oral Daily    hydrochlorothiazide  12.5 mg Oral Daily    insulin aspart  1-7 Units Subcutaneous TID AC    insulin aspart  1-5 Units Subcutaneous At Bedtime    metoprolol tartrate  25 mg Oral BID    montelukast  10 mg Oral At Bedtime    multivitamin, therapeutic  1 tablet Oral Daily    pantoprazole  40 mg Oral BID AC    polyethylene glycol  17 g Oral Daily    prazosin  2 mg Oral TID    senna-docusate  1 tablet Oral BID    sodium chloride (PF)  3 mL Intracatheter Q8H    venlafaxine  150 mg Oral Daily    Vitamin D3  5,000 Units Oral Daily    vitamin E  400 Units Oral Daily       Data reviewed today: I personally reviewed all new medications, labs,  imaging/diagnostics reports over the past 24 hours. Pertinent findings include:    Imaging:   No results found for this or any previous visit (from the past 24 hour(s)).    Labs:  XR Finger Left G/E 2 Views   Final Result   IMPRESSION: Mild degenerative changes and spurring at the IP joint of the left thumb. Exam otherwise negative. No evidence for fracture. No findings for osteomyelitis.           Recent Results (from the past 24 hour(s))   Glucose by meter    Collection Time: 02/13/24  6:10 PM   Result Value Ref Range    GLUCOSE BY METER POCT 123 (H) 70 - 99 mg/dL   Glucose by meter    Collection Time: 02/13/24  9:49 PM   Result Value Ref Range    GLUCOSE BY METER POCT 156 (H) 70 - 99 mg/dL   Glucose by meter    Collection Time: 02/14/24  2:16 AM   Result Value Ref Range    GLUCOSE BY METER POCT 125 (H) 70 - 99 mg/dL   Creatinine    Collection Time: 02/14/24  6:16 AM   Result Value Ref Range    Creatinine 0.79 0.67 - 1.17 mg/dL    GFR Estimate >90 >60 mL/min/1.73m2   Glucose    Collection Time: 02/14/24  6:16 AM   Result Value Ref Range    Glucose 162 (H) 70 - 99 mg/dL   CBC with platelets and differential    Collection Time: 02/14/24  6:16 AM   Result Value Ref Range    WBC Count 7.5 4.0 - 11.0 10e3/uL    RBC Count 4.10 (L) 4.40 - 5.90 10e6/uL    Hemoglobin 12.8 (L) 13.3 - 17.7 g/dL    Hematocrit 37.0 (L) 40.0 - 53.0 %    MCV 90 78 - 100 fL    MCH 31.2 26.5 - 33.0 pg    MCHC 34.6 31.5 - 36.5 g/dL    RDW 13.1 10.0 - 15.0 %    Platelet Count 245 150 - 450 10e3/uL    % Neutrophils 66 %    % Lymphocytes 22 %    % Monocytes 10 %    % Eosinophils 2 %    % Basophils 0 %    % Immature Granulocytes 0 %    NRBCs per 100 WBC 0 <1 /100    Absolute Neutrophils 5.0 1.6 - 8.3 10e3/uL    Absolute Lymphocytes 1.7 0.8 - 5.3 10e3/uL    Absolute Monocytes 0.7 0.0 - 1.3 10e3/uL    Absolute Eosinophils 0.1 0.0 - 0.7 10e3/uL    Absolute Basophils 0.0 0.0 - 0.2 10e3/uL    Absolute Immature Granulocytes 0.0 <=0.4 10e3/uL    Absolute  NRBCs 0.0 10e3/uL   Glucose by meter    Collection Time: 02/14/24  8:27 AM   Result Value Ref Range    GLUCOSE BY METER POCT 239 (H) 70 - 99 mg/dL   Glucose by meter    Collection Time: 02/14/24 12:09 PM   Result Value Ref Range    GLUCOSE BY METER POCT 130 (H) 70 - 99 mg/dL       Pending Labs:  Unresulted Labs Ordered in the Past 30 Days of this Admission       Date and Time Order Name Status Description    2/12/2024 10:59 AM Synovial fluid Aerobic Bacterial Culture Routine Preliminary     2/12/2024 10:59 AM Anaerobic Bacterial Culture Routine Preliminary     2/12/2024 10:57 AM Abscess Aerobic Bacterial Culture Routine Preliminary     2/12/2024 10:57 AM Anaerobic Bacterial Culture Routine Preliminary             ANDREY TOBAR MD  [facility]  Phone: #[phone]    Securely message me with Alirio (more info)

## 2024-02-14 NOTE — PLAN OF CARE
Problem: Adult Inpatient Plan of Care  Goal: Plan of Care Review  Description: The Plan of Care Review/Shift note should be completed every shift.  The Outcome Evaluation is a brief statement about your assessment that the patient is improving, declining, or no change.  This information will be displayed automatically on your shift  note.  2/14/2024 0645 by Gia Blakely RN  Outcome: Progressing  2/14/2024 0645 by Gia Blakely RN  Outcome: Progressing     Problem: Pain Acute  Goal: Optimal Pain Control and Function  2/14/2024 0645 by Gia Blakely RN  Outcome: Progressing  2/14/2024 0645 by Gia Blakely RN  Outcome: Progressing     Problem: Infection  Goal: Absence of Infection Signs and Symptoms  2/14/2024 0645 by Gia Blakely RN  Outcome: Progressing  2/14/2024 0645 by Gia Blakely RN  Outcome: Progressing   Goal Outcome Evaluation:  Pt vitals stable overnight, afebrile. Pain controlled with tylenol. Did another warm water soak and redressing.

## 2024-02-14 NOTE — PROGRESS NOTES
INFECTIOUS DISEASE FOLLOW UP NOTE    Date: 2024   CHIEF COMPLAINT:   Chief Complaint   Patient presents with    Wound Check     Left thumb        ASSESSMENT:    Left thumb IP joint septic arthritis, abscess: rapid onset following cut. S/p I&D, arthrotomy , OR cultures with staph aureus, susceptibilities pending  Remote PCN allergy: tolerates cephalosporins (and per patient amoxicillin)      PLAN:  - continue ceftriaxone IV  - continue daptomycin IV  - follow susceptibilities on staph aureus. If doxycycline sensitive, this could be a good PO option for additional 3 weeks  - further recommendations to follow clinical course  - ID will follow, thanks    Sue Stein MD  [facility]   On-Call: [phone]     ______________________________________________________________________    SUBJECTIVE / INTERVAL HISTORY: feels a lot better. Tolerating antibiotics. Discussed micro.     ROS: All other systems negative except as listed above.    SH/FH/Habits/PMH reviewed and unchanged.    OBJECTIVE:  BP (!) 140/76 (BP Location: Right arm, Patient Position: Semi-Ross's)   Pulse 91   Temp 98.4  F (36.9  C) (Oral)   Resp 17   Ht 1.829 m (6')   Wt 118.8 kg (262 lb)   SpO2 94%   BMI 35.53 kg/m       Resp: 17      Vital Signs  Temp: 98.5  F (36.9  C)  Temp src: Oral  Resp: 16  Pulse: 87  Pulse Rate Source: Monitor  BP: 138/79  BP Location: Left arm    Temp (24hrs), Av.2  F (36.8  C), Min:97.8  F (36.6  C), Max:98.7  F (37.1  C)      GEN: No acute distress.    RESPIRATORY:  Normal breathing pattern.   EXTREMITIES: thumb with sutures, improving erythema and edema.   SKIN/HAIR/NAILS:  No rashes  IV: peripheral IV        Antibiotics:  Ceftriaxone   daptomycin    Pertinent labs:  CRP   Date Value Ref Range Status   2020 0.4 0.0 - 0.8 mg/dL Final      CBC RESULTS:   Recent Labs   Lab Test 24  0650   WBC 7.8   RBC 3.83*   HGB 11.9*   HCT 34.7*   MCV 91   MCH 31.1   MCHC 34.3    RDW 13.3         Last Comprehensive Metabolic Panel:  Sodium   Date Value Ref Range Status   02/12/2024 139 135 - 145 mmol/L Final     Comment:     Reference intervals for this test were updated on 09/26/2023 to more accurately reflect our healthy population. There may be differences in the flagging of prior results with similar values performed with this method. Interpretation of those prior results can be made in the context of the updated reference intervals.      Potassium   Date Value Ref Range Status   02/12/2024 4.1 3.4 - 5.3 mmol/L Final   02/18/2022 3.7 3.5 - 5.0 mmol/L Final     Chloride   Date Value Ref Range Status   02/12/2024 104 98 - 107 mmol/L Final   02/18/2022 102 98 - 107 mmol/L Final     Carbon Dioxide (CO2)   Date Value Ref Range Status   02/12/2024 26 22 - 29 mmol/L Final   02/18/2022 27 22 - 31 mmol/L Final     Anion Gap   Date Value Ref Range Status   02/12/2024 9 7 - 15 mmol/L Final   02/18/2022 8 5 - 18 mmol/L Final     Glucose   Date Value Ref Range Status   02/14/2024 162 (H) 70 - 99 mg/dL Final   02/18/2022 160 (H) 70 - 125 mg/dL Final     GLUCOSE BY METER POCT   Date Value Ref Range Status   02/14/2024 239 (H) 70 - 99 mg/dL Final     Urea Nitrogen   Date Value Ref Range Status   02/12/2024 11.1 6.0 - 20.0 mg/dL Final   02/18/2022 10 8 - 22 mg/dL Final     Creatinine   Date Value Ref Range Status   02/14/2024 0.79 0.67 - 1.17 mg/dL Final     GFR Estimate   Date Value Ref Range Status   02/14/2024 >90 >60 mL/min/1.73m2 Final   03/05/2021 >60 >60 mL/min/1.73m2 Final     Calcium   Date Value Ref Range Status   02/12/2024 9.1 8.6 - 10.0 mg/dL Final        MICROBIOLOGY DATA:  Personally reviewed.  7-Day Micro Results       Collected Updated Procedure Result Status      02/12/2024 1055 02/13/2024 1416 Anaerobic Bacterial Culture Routine [[id number]]    Synovial fluid from Thumb, Left    Preliminary result Component Value   Culture No anaerobic organisms isolated after 1 day  [P]                 02/12/2024 1055 02/12/2024 1438 Gram Stain [[id number]]   Synovial fluid from Thumb, Left    Final result Component Value   Gram Stain Result No organisms seen   Gram Stain Result 2+ WBC seen            02/12/2024 1055 02/14/2024 0752 Synovial fluid Aerobic Bacterial Culture Routine [[id number]]    (Abnormal)   Synovial fluid from Thumb, Left    Preliminary result Component Value   Culture Culture in progress  [P]     1+ Staphylococcus aureus  [P]                02/12/2024 1045 02/13/2024 1404 Anaerobic Bacterial Culture Routine [[id number]]   Abscess from Thumb, Left    Preliminary result Component Value   Culture No anaerobic organisms isolated after 1 day  [P]                02/12/2024 1045 02/12/2024 1456 Gram Stain [[id number]]   Abscess from Thumb, Left    Final result Component Value   Gram Stain Result No organisms seen   Gram Stain Result 4+ WBC seen   Predominantly PMNs            02/12/2024 1045 02/13/2024 0730 Abscess Aerobic Bacterial Culture Routine [[id number]]   Abscess from Thumb, Left    Preliminary result Component Value   Culture Culture in progress  [P]                02/11/2024 2323 02/12/2024 1132 MRSA MSSA PCR, Nasal Swab [[id number]]    Swab from Nares, Bilateral    Final result Component Value   MRSA Target DNA Negative   SA Target DNA Negative                     RADIOLOGY:  Personally Reviewed.  No results found for this or any previous visit (from the past 24 hour(s)).    Principal Problem:    Laceration of left thumb with infection, initial encounter

## 2024-02-15 VITALS
WEIGHT: 262 LBS | RESPIRATION RATE: 16 BRPM | OXYGEN SATURATION: 97 % | HEIGHT: 72 IN | DIASTOLIC BLOOD PRESSURE: 82 MMHG | SYSTOLIC BLOOD PRESSURE: 152 MMHG | HEART RATE: 91 BPM | BODY MASS INDEX: 35.49 KG/M2 | TEMPERATURE: 98.2 F

## 2024-02-15 LAB
BACTERIA ABSC ANAEROBE+AEROBE CULT: ABNORMAL
BACTERIA SNV CULT: ABNORMAL
BACTERIA SNV CULT: ABNORMAL
GLUCOSE BLDC GLUCOMTR-MCNC: 140 MG/DL (ref 70–99)
GLUCOSE BLDC GLUCOMTR-MCNC: 160 MG/DL (ref 70–99)
GLUCOSE BLDC GLUCOMTR-MCNC: 163 MG/DL (ref 70–99)

## 2024-02-15 PROCEDURE — 250N000013 HC RX MED GY IP 250 OP 250 PS 637: Performed by: STUDENT IN AN ORGANIZED HEALTH CARE EDUCATION/TRAINING PROGRAM

## 2024-02-15 PROCEDURE — 250N000013 HC RX MED GY IP 250 OP 250 PS 637: Performed by: INTERNAL MEDICINE

## 2024-02-15 PROCEDURE — 99239 HOSP IP/OBS DSCHRG MGMT >30: CPT | Performed by: STUDENT IN AN ORGANIZED HEALTH CARE EDUCATION/TRAINING PROGRAM

## 2024-02-15 PROCEDURE — 99232 SBSQ HOSP IP/OBS MODERATE 35: CPT | Performed by: INTERNAL MEDICINE

## 2024-02-15 RX ORDER — CLINDAMYCIN HCL 300 MG
600 CAPSULE ORAL 3 TIMES DAILY
Qty: 52 CAPSULE | Refills: 0 | Status: SHIPPED | OUTPATIENT
Start: 2024-02-15

## 2024-02-15 RX ADMIN — INSULIN ASPART 1 UNITS: 100 INJECTION, SOLUTION INTRAVENOUS; SUBCUTANEOUS at 08:43

## 2024-02-15 RX ADMIN — HYDROCHLOROTHIAZIDE 12.5 MG: 12.5 CAPSULE ORAL at 08:26

## 2024-02-15 RX ADMIN — ACETAMINOPHEN 650 MG: 325 TABLET ORAL at 11:49

## 2024-02-15 RX ADMIN — LOSARTAN POTASSIUM 100 MG: 50 TABLET, FILM COATED ORAL at 08:26

## 2024-02-15 RX ADMIN — CYANOCOBALAMIN TAB 1000 MCG 1000 MCG: 1000 TAB at 08:25

## 2024-02-15 RX ADMIN — Medication 90 MG: at 08:27

## 2024-02-15 RX ADMIN — THERA TABS 1 TABLET: TAB at 08:26

## 2024-02-15 RX ADMIN — AMLODIPINE BESYLATE 10 MG: 10 TABLET ORAL at 08:26

## 2024-02-15 RX ADMIN — ACETAMINOPHEN 650 MG: 325 TABLET ORAL at 02:00

## 2024-02-15 RX ADMIN — FEXOFENADINE HYDROCHLORIDE 60 MG: 60 TABLET ORAL at 08:25

## 2024-02-15 RX ADMIN — CHOLECALCIFEROL TAB 125 MCG (5000 UNIT) 5000 UNITS: 125 TAB at 08:26

## 2024-02-15 RX ADMIN — PRAZOSIN HYDROCHLORIDE 2 MG: 2 CAPSULE ORAL at 08:26

## 2024-02-15 RX ADMIN — Medication 400 UNITS: at 08:25

## 2024-02-15 RX ADMIN — ASPIRIN 81 MG: 81 TABLET, COATED ORAL at 08:26

## 2024-02-15 RX ADMIN — VENLAFAXINE HYDROCHLORIDE 150 MG: 150 CAPSULE, EXTENDED RELEASE ORAL at 08:25

## 2024-02-15 RX ADMIN — INSULIN ASPART 1 UNITS: 100 INJECTION, SOLUTION INTRAVENOUS; SUBCUTANEOUS at 11:55

## 2024-02-15 RX ADMIN — PANTOPRAZOLE SODIUM 40 MG: 40 TABLET, DELAYED RELEASE ORAL at 08:26

## 2024-02-15 RX ADMIN — FOLIC ACID 1 MG: 1 TABLET ORAL at 08:26

## 2024-02-15 RX ADMIN — METOPROLOL TARTRATE 25 MG: 25 TABLET, FILM COATED ORAL at 08:26

## 2024-02-15 ASSESSMENT — ACTIVITIES OF DAILY LIVING (ADL)
ADLS_ACUITY_SCORE: 22

## 2024-02-15 NOTE — PLAN OF CARE
Day RN ([phone])    Patient discharged home from the hospital at 1340.  Reviewed discharge instructions with patient.  Questions answered. Patient discharged to home with knowledge of where to  home meds, discharge instructions, extra dressing supplies, and belongings.  Eager and adequate for discharge.    Pt A/O x4.  VSS and afebrile - a bit hypertensive but scheduled BP meds then given.  Home cpap overnight.  Pain managed adequately with prn PO tylenol.  CMS intact - very mild swelling to fingers of left hand, some stiffness there but improving with range of motion/warm soaks per pt.  Dressing CDI - wound soaked this AM between 2761-2135, redressed and patient verbalizes understanding and confidence with soaking and redressing in this manner at home (has family support as well to help if needed).  Up independently.  Voiding adequately.  Tolerating regular diet well.   and 163 w/ insulin coverage here in hospital.  IV Rocephin and daptomycin as hospital antibiotic course, switching to PO clindamycin at home and pt educated with repeat back what this  plan of care will look like at home.  Plan is home with PO antibiotics on discharge.  Will continue to monitor.

## 2024-02-15 NOTE — PLAN OF CARE
"  Problem: Adult Inpatient Plan of Care  Goal: Plan of Care Review  Description: The Plan of Care Review/Shift note should be completed every shift.  The Outcome Evaluation is a brief statement about your assessment that the patient is improving, declining, or no change.  This information will be displayed automatically on your shift  note.  Outcome: Progressing  Goal: Patient-Specific Goal (Individualized)  Description: You can add care plan individualizations to a care plan. Examples of Individualization might be:  \"Parent requests to be called daily at 9am for status\", \"I have a hard time hearing out of my right ear\", or \"Do not touch me to wake me up as it startles  me\".  Outcome: Progressing  Goal: Absence of Hospital-Acquired Illness or Injury  Outcome: Progressing  Intervention: Identify and Manage Fall Risk  Recent Flowsheet Documentation  Taken 2/15/2024 0000 by Annie Trent RN  Safety Promotion/Fall Prevention:   room organization consistent   safety round/check completed   room near nurse's station  Taken 2/14/2024 2000 by Annie Trent RN  Safety Promotion/Fall Prevention:   room organization consistent   safety round/check completed   room near nurse's station  Intervention: Prevent Skin Injury  Recent Flowsheet Documentation  Taken 2/15/2024 0000 by Annie Trent RN  Body Position: position changed independently  Taken 2/14/2024 2000 by Annie Trent RN  Body Position: position changed independently  Intervention: Prevent and Manage VTE (Venous Thromboembolism) Risk  Recent Flowsheet Documentation  Taken 2/15/2024 0000 by Annie Trent RN  VTE Prevention/Management: SCDs (sequential compression devices) on  Taken 2/14/2024 2000 by Annie Trent RN  VTE Prevention/Management: SCDs (sequential compression devices) on  Intervention: Prevent Infection  Recent Flowsheet Documentation  Taken 2/15/2024 0000 by Annie Trent RN  Infection Prevention: hand hygiene promoted  Taken " 2/14/2024 2000 by Annie Trent RN  Infection Prevention: hand hygiene promoted  Goal: Optimal Comfort and Wellbeing  Outcome: Progressing  Intervention: Monitor Pain and Promote Comfort  Recent Flowsheet Documentation  Taken 2/15/2024 0245 by Annie Trent RN  Pain Management Interventions: medication (see MAR)  Goal: Readiness for Transition of Care  Outcome: Progressing     Problem: Pain Acute  Goal: Optimal Pain Control and Function  Outcome: Progressing  Intervention: Develop Pain Management Plan  Recent Flowsheet Documentation  Taken 2/15/2024 0245 by Annie Trent RN  Pain Management Interventions: medication (see MAR)  Intervention: Prevent or Manage Pain  Recent Flowsheet Documentation  Taken 2/15/2024 0000 by Annie Trent RN  Medication Review/Management: medications reviewed  Taken 2/14/2024 2000 by Annie Trent RN  Medication Review/Management: medications reviewed     Problem: Infection  Goal: Absence of Infection Signs and Symptoms  Outcome: Progressing   Goal Outcome Evaluation:       Patient VSS. Pain treated adequated with tylenol. No numbness/tingling in extremities. Patient declined third warm soapy soak overnight. Patient slept well throughout night. Independent in room. Voiding spontaneously. Care and education on-going.  Annie Trent, RN

## 2024-02-15 NOTE — PROGRESS NOTES
Care Management Discharge Note    Discharge Date: 02/15/2024       Discharge Disposition: Home    Discharge Services: None    Discharge DME: None    Discharge Transportation: family or friend will provide, car, drives self    Private pay costs discussed: Not applicable      Handoff Referral Completed: Yes    Additional Information:  Chart reviewed, anticipate pt will discharge home today.  No CM needs indicated at this time.    ANISHA Schmitz

## 2024-02-15 NOTE — DISCHARGE SUMMARY
[facility]  Hospitalist Discharge Summary      Date of Admission:  2/11/2024  Date of Discharge:  2/15/2024  1:41 PM  Discharging Provider: ANDREY TOBAR MD  Discharge Service: Hospitalist Service    Discharge Diagnoses   #Laceration Left thumb  #Left thumb IP joint septic arthritis  #T2DM  #HTN  #Depression  #Asthma      Disposition: inpatient    Clinically Significant Risk Factors     # Obesity: Estimated body mass index is 35.53 kg/m  as calculated from the following:    Height as of this encounter: 1.829 m (6').    Weight as of this encounter: 118.8 kg (262 lb).       Follow-ups Needed After Discharge   Follow-up Appointments     Follow Up Care      Follow-up with your Dr. Crenshaw's team in 2-3 days after discharge for   wound check. Call [place] at [phone] to schedule the appointment.            Unresulted Labs Ordered in the Past 30 Days of this Admission       Date and Time Order Name Status Description    2/12/2024 10:59 AM Synovial fluid Aerobic Bacterial Culture Routine Preliminary     2/12/2024 10:59 AM Anaerobic Bacterial Culture Routine Preliminary     2/12/2024 10:57 AM Anaerobic Bacterial Culture Routine Preliminary         These results will be followed up by our group    Discharge Disposition   Discharged to home  Condition at discharge: Stable    Hospital Course   54-year-old male with past medical history of hypertension, type 2 diabetes, depression, GARETT on CPAP, and asthma who to the emergency department with painful left thumb wound that was purulent and limited movement in the joint, given this orthopedics was consulted and took patient to OR on 2/12 for septic arthritis. Synovial fluid grew staph aureus and Staph capitis. ID is consulted and recommended with IV daptomycin and ceftriaxone while in the hospital and clindamycin at discharge until 3/3. He will follow up with orthopedic surgery.       Consultations This Hospital Stay   ORTHOPEDIC SURGERY IP  "CONSULT  INFECTIOUS DISEASES IP CONSULT  CARE MANAGEMENT / SOCIAL WORK IP CONSULT    Code Status   Prior    Time Spent on this Encounter   I, ANDREY TOBAR MD, personally saw the patient today and spent greater than 30 minutes discharging this patient.       ANDREY TOBAR MD  [address] [address]  Phone: [phone]  Fax: [phone]  ______________________________________________________________________    Physical Exam   Vital Signs: Temp: 98.2  F (36.8  C) Temp src: Oral BP: (!) 152/82 Pulse: 91   Resp: 16 SpO2: 97 % O2 Device: None (Room air)    Weight: 262 lbs 0 oz  General Appearance: Alert and wake, not in distress  Neurology: oriented x 3  Psych: cooperative and calm, normal affect       Primary Care Physician   Mickey Morse    Discharge Orders      Reason for your hospital stay    Thumb I&D     When to call - Contact Surgeon Team    You may experience symptoms that require follow-up before your scheduled appointment. Refer to the \"Stoplight Tool\" for instructions on when to contact your Surgeon Team if you are concerned about pain control, blood clots, constipation, or if you are unable to urinate.     When to call - Reach out to Urgent Care    If you are not able to reach your Surgeon Team and you need immediate care, go to the Orthopedic Walk-in Clinic or Urgent Care at your Surgeon's office.  Do NOT go to the Emergency Room unless you have shortness of breath, chest pain, or other signs of a medical emergency.     When to call - Reasons to Call 911    Call 911 immediately if you experience sudden-onset chest pain, arm weakness/numbness, slurred speech, or shortness of breath     Discharge Instruction - Breathing exercises    Perform breathing exercises using your Incentive Spirometer 10 times per hour while awake for 2 weeks.     Symptoms - Fever Management    A low grade fever can be expected after surgery.  Use acetaminophen " (TYLENOL) as needed for fever management.  Contact your Surgeon Team if you have a fever greater than 101.5 F, chills, and/or night sweats.     Symptoms - Constipation management    Constipation (hard, dry bowel movements) is expected after surgery due to the combination of being less active, the anesthetic, and the opioid pain medication.  You can do the following to help reduce constipation:  ~  FLUIDS:  Drink clear liquids (water or Gatorade), or juice (apple/prune).  ~  DIET:  Eat a fiber rich diet.    ~  ACTIVITY:  Get up and move around several times a day.  Increase your activity as you are able.  MEDICATIONS:  Reduce the risk of constipation by starting medications before you are constipated.  You can take Miralax   (1 packet as directed) and/or a stool softener (Senokot 1-2 tablets 1-2 times a day).  If you already have constipation and these medications are not working, you can get magnesium citrate and use as directed.  If you continue to have constipation you can try an over the counter suppository or enema.  Call your Surgeon Team if it has been greater than 3 days since your last bowel movement.     Symptoms - Reduced Urine Output    Changes in the amount of fluids you drank before and after surgery may result in problems urinating.  It is important to stay well-hydrated after surgery and drink plenty of water. If it has been greater than 8 hours since you have urinated despite drinking plenty of water, call your Surgeon Team.     Comfort and Pain Management - Pain after Surgery    Pain after surgery is normal and expected.  You will have some amount of pain for several weeks after surgery.  Your pain will improve with time.  There are several things you can do to help reduce your pain including: rest, ice, elevation, and using pain medications as needed. Contact your Surgeon Team if you have pain that persists or worsens after surgery despite rest, ice, elevation, and taking your medication(s) as  prescribed. Contact your Surgeon Team if you have new numbness, tingling, or weakness in your operative extremity.     Comfort and Pain Management - Swelling after Surgery    Swelling and/or bruising of the surgical extremity is common and may persist for several months after surgery. In addition to frequent icing and elevation, gentle compressive support with an ACE wrap or tubigrip may help with swelling. Apply compression regularly, removing at least twice daily to perform skin checks. Contact your Surgeon Team if your swelling increases and is NOT associated with an increase in your activity level, or if your swelling increases and is associated with redness and pain.     Comfort and Pain Management - Cold therapy    Ice can be used to control swelling and discomfort after surgery. Place a thin towel over your operative site and apply the ice pack overtop. Leave ice pack in place for 20 minutes, then remove for 20 minutes. Repeat this 20 minutes on/20 minutes off routine as often as tolerated.     Medication Instructions - Acetaminophen (TYLENOL) Instructions    You were discharged with acetaminophen (TYLENOL) for pain management after surgery. Acetaminophen most effectively manages pain symptoms when it is taken on a schedule without missing doses (every four, six, or eight hours). Your Provider will prescribe a safe daily dose between 3000 - 4000 mg.  Do NOT exceed this daily dose. Most patients use acetaminophen for pain control for the first four weeks after surgery.  You can wean from this medication as your pain decreases.     Medication Instructions - NSAID Instructions    You were discharged with an anti-inflammatory medication for pain management to use in combination with acetaminophen (TYLENOL) and the narcotic pain medication.  Take this medication exactly as directed.  You should only take one anti-inflammatory at a time.  Some common anti-inflammatories include: ibuprofen (ADVIL, MOTRIN), naproxen  (ALEVE, NAPROSYN), celecoxib (CELEBREX), meloxicam (MOBIC), ketorolac (TORADOL).  Take this medication with food and water.     Medication Instructions - Opioids - Tapering Instructions    In the first three days following surgery, your symptoms may warrant use of the narcotic pain medication every four to six hours as prescribed. This is normal. As your pain symptoms improve, focus your efforts on decreasing (tapering) use of narcotic medications. The most successful tapering strategy is to first, decrease the number of tablets you take every 4-6 hours to the minimum prescribed. Then, increase the amount of time between doses.  For example:  First, taper to   or 1 tablet every 4-6 hours.  Then, taper to   or 1 tablet every 6-8 hours.  Then, taper to   or 1 tablet every 8-10 hours.  Then, taper to   or 1 tablet every 10-12 hours.  Then, taper to   or 1 tablet at bedtime.  The bedtime dose can help with comfort during sleep and is typically the last dose to be discontinued after surgery.     Follow Up Care    Follow-up with your Dr. Crenshaw's team in 2-3 days after discharge for wound check. Call [facility] at [phone] to schedule the appointment.     Comfort and Pain Management - UPPER extremity Elevation    Swelling is expected for several months after surgery. This type of swelling is usually associated with gravity and activity, and can be improved with elevation.   The best way to do this is to get your hand above your heart by sitting down, resting your elbow on a pillow or arm rest, with your hand in the air. Perform this elevation as often as possible especially for the first two weeks after surgery     Medication Instructions - Opioid Instructions (Less than 65 years)    You were discharged with an opioid medication (hydromorphone, oxycodone, hydrocodone, or tramadol). This medication should only be taken for breakthrough pain that is not controlled with acetaminophen (TYLENOL). If you rate your pain less  than 3 you do not need this medication.  Pain rating 0-3:  You do not need this medication.  Pain rating 4-6:  Take 1 tablet every 4-6 hours as needed  Pain rating 7-10:  Take 2 tablets every 4-6 hours as needed.  Do not exceed 6 tablets per day     Return to Driving    Return to driving - Driving is NOT permitted until directed by your provider. Under no circumstance are you permitted to drive while using narcotic pain medications.     Dressing / Wound Care - Wound    You have a clean dressing on your surgical wound. Dressing change instructions as follows: change your dressing daily until your follow-up appointment and performed 2-3 times daily warm water soaks.  Contact your Surgeon Team if you have increased redness, warmth around the surgical wound, and/or drainage from the surgical wound.     Dressing / Wound Care - NO Tub Bathing    Tub bathing, swimming, or any other activities that will cause your incision to be submerged in water should be avoided until allowed by your Surgeon.     Dressing Wound Care - Shower with wound/dressing NOT covered    You do not need to cover your dressing or incision in the shower, you may allow water and soap to run over top of the surgical dressing or incision. You may shower 1 days after surgery.  You are strictly prohibited from soaking or submerging the surgical wound under dirty water such as dishes or swimming pools.  Okay to perform warm water soaks 3 times daily.     Reason for your hospital stay    L thumb infection     Activity    Your activity upon discharge: activity as tolerated     Discharge Instruction - Regular Diet Adult    Return to your pre-surgery diet unless instructed otherwise     Diet    Follow this diet upon discharge: Orders Placed This Encounter      Advance Diet as Tolerated: Regular Diet Adult      Discharge Instruction - Regular Diet Adult       Significant Results and Procedures   7-Day Micro Results       Collected Updated Procedure Result Status       02/12/2024 1055 02/15/2024 1416 Anaerobic Bacterial Culture Routine [[id number]]    Synovial fluid from Thumb, Left    Preliminary result Component Value   Culture No anaerobic organisms isolated after 3 days  [P]                02/12/2024 1055 02/12/2024 1438 Gram Stain [[id number]]   Synovial fluid from Thumb, Left    Final result Component Value   Gram Stain Result No organisms seen   Gram Stain Result 2+ WBC seen            02/12/2024 1055 02/15/2024 0920 Synovial fluid Aerobic Bacterial Culture Routine [[id number]]     (Abnormal)   Synovial fluid from Thumb, Left    Preliminary result Component Value   Culture 1+ Staphylococcus aureus  [P]     1+ Staphylococcus capitis  [P]         Susceptibility        Staphylococcus aureus      GILBERTO      Clindamycin 0.25 ug/mL Susceptible      Daptomycin 0.5 ug/mL Susceptible      Doxycycline <=0.5 ug/mL Susceptible      Erythromycin <=0.25 ug/mL Susceptible      Gentamicin <=0.5 ug/mL Susceptible      Oxacillin 0.5 ug/mL Susceptible  [1]       Tetracycline >=16 ug/mL Resistant      Trimethoprim/Sulfamethoxazole <=0.5/9.5 ug/mL Susceptible      Vancomycin <=0.5 ug/mL Susceptible                   [1]  Oxacillin susceptible isolates are susceptible to cephalosporins (example: cefazolin and cephalexin) and beta lactam combination agents. Oxacillin resistant isolates are resistant to these agents.              Susceptibility Comments       Staphylococcus aureus                      02/12/2024 1045 02/15/2024 1404 Anaerobic Bacterial Culture Routine [[id number]]   Abscess from Thumb, Left    Preliminary result Component Value   Culture No anaerobic organisms isolated after 3 days  [P]                02/12/2024 1045 02/12/2024 1456 Gram Stain [[id number]]   Abscess from Thumb, Left    Final result Component Value   Gram Stain Result No organisms seen   Gram Stain Result 4+ WBC seen   Predominantly PMNs            02/12/2024 1045 02/15/2024 0919 Abscess Aerobic  Bacterial Culture Routine [[id number]]   (Abnormal)   Abscess from Thumb, Left    Final result Component Value   Culture 1+ Staphylococcus aureus    Susceptibilities done on previous cultures               02/11/2024 2323 02/12/2024 1132 MRSA MSSA PCR, Nasal Swab [[id number]]    Swab from Nares, Bilateral    Final result Component Value   MRSA Target DNA Negative   SA Target DNA Negative                      Most Recent 3 CBC's:  Recent Labs   Lab Test 02/14/24  0616 02/13/24  0650 02/12/24  0551   WBC 7.5 7.8 8.3   HGB 12.8* 11.9* 11.6*   MCV 90 91 91    201 180     Most Recent 3 BMP's:  Recent Labs   Lab Test 02/15/24  1152 02/15/24  0826 02/15/24  0200 02/14/24  0827 02/14/24  0616 02/12/24  0923 02/12/24  0551 02/11/24 2226 02/11/24 2052 09/01/23  1639   NA  --   --   --   --   --   --  139  --  138 140   POTASSIUM  --   --   --   --   --   --  4.1  --  4.0 4.0   CHLORIDE  --   --   --   --   --   --  104  --  101 100   CO2  --   --   --   --   --   --  26 --  24 28   BUN  --   --   --   --   --   --  11.1  --  11.0 16.4   CR  --   --   --   --  0.79  --  0.74  --  0.76 0.93   ANIONGAP  --   --   --   --   --   --  9  --  13 12   SANTANA  --   --   --   --   --   --  9.1  --  10.1* 9.3   * 160* 140*   < > 162*   < > 122*   < > 118* 81    < > = values in this interval not displayed.     Most Recent 2 LFT's:  Recent Labs   Lab Test 02/12/24 0551 02/11/24 2052   AST 19 19   ALT 20 20   ALKPHOS 52 58   BILITOTAL 0.6 0.7   ,   Results for orders placed or performed during the hospital encounter of 02/11/24   XR Finger Left G/E 2 Views    Narrative    EXAM: XR FINGER LEFT G/E 2 VIEWS  LOCATION: [facility]  DATE: 2/11/2024    INDICATION: left thumb pain, trauma, non healing wound  COMPARISON: None.      Impression    IMPRESSION: Mild degenerative changes and spurring at the IP joint of the left thumb. Exam otherwise negative. No evidence for fracture. No findings for  osteomyelitis.         Discharge Medications   Discharge Medication List as of 2/15/2024 12:52 PM        START taking these medications    Details   clindamycin (CLEOCIN) 300 MG capsule Take 2 capsules (600 mg) by mouth 3 times daily, Disp-52 capsule, R-0, E-Prescribe           CONTINUE these medications which have NOT CHANGED    Details   acetaminophen (TYLENOL) 500 MG tablet Take 500-1,000 mg by mouth every 6 hours as needed for mild pain, Historical      amLODIPine-Olmesartan 10-40 MG TABS Take 1 tablet by mouth daily, Historical      aspirin 81 MG EC tablet Take 81 mg by mouth daily, Historical      atorvastatin (LIPITOR) 80 MG tablet Take 80 mg by mouth at bedtime, Historical      cholecalciferol 125 MCG (5000 UT) CAPS Take 5,000 Units by mouth daily, Historical      Coenzyme Q10 400 MG CAPS Take 400 mg by mouth daily, Historical      cyanocobalamin (VITAMIN B-12) 1000 MCG tablet Take 1,000 mcg by mouth daily, Historical      Ferrous Sulfate 90 (18 Fe) MG TABS Take 1 tablet by mouth daily, Historical      fexofenadine (ALLEGRA) 60 MG tablet Take 60 mg by mouth 2 times daily, Historical      fluticasone (FLONASE) 50 MCG/ACT nasal spray Spray 2 sprays into both nostrils daily, Historical      folic acid (FOLVITE) 1 MG tablet Take 1 mg by mouth daily, Historical      hydrochlorothiazide (MICROZIDE) 12.5 MG capsule Take 1 capsule by mouth daily, Historical      JANUVIA 25 MG tablet Take 1 tablet by mouth daily, SWAPNIL, Historical      magnesium 200 MG TABS Take 1 tablet by mouth daily, Historical      metFORMIN (GLUCOPHAGE) 500 MG tablet Take 1,000 mg by mouth 2 times daily (with meals), Historical      metoprolol tartrate (LOPRESSOR) 25 MG tablet Take 25 mg by mouth 2 times daily, Historical      montelukast (SINGULAIR) 10 MG tablet Take 10 mg by mouth at bedtime, Historical      Multiple Vitamin (MULTI VITAMIN) TABS Take 1 tablet by mouth daily, Historical      omega 3 1200 MG CAPS Take 1 capsule by mouth daily,  Historical      omeprazole (PRILOSEC) 40 MG DR capsule Take 40 mg by mouth 2 times daily (before meals), Historical      prazosin (MINIPRESS) 2 MG capsule Take 2 mg by mouth 3 times daily, Historical      Testosterone Enanthate (XYOSTED) 100 MG/0.5ML SOAJ Inject 100 mg Subcutaneous once a week, Historical      TRULICITY 0.75 MG/0.5ML pen Inject 0.75 mg Subcutaneous every 7 days, SWAPNIL, Historical      venlafaxine (EFFEXOR XR) 150 MG 24 hr capsule Take 150 mg by mouth daily, Historical           STOP taking these medications       cephALEXin (KEFLEX) 500 MG capsule Comments:   Reason for Stopping:         Vitamin E 268 MG (400 UNIT) CAPS Comments:   Reason for Stopping:             Allergies   Allergies   Allergen Reactions    Penicillins Unknown     Loss of airway    Percocet [Oxycodone-Acetaminophen] Unknown     nausea    Sulfa (Sulfonamide Antibiotics) [Sulfa Antibiotics] Unknown     hives

## 2024-02-15 NOTE — PROGRESS NOTES
INFECTIOUS DISEASE FOLLOW UP NOTE    Date: 02/15/2024   CHIEF COMPLAINT:   Chief Complaint   Patient presents with     Wound Check     Left thumb        ASSESSMENT:    1. Left thumb IP joint septic arthritis, abscess: rapid onset following cut. S/p I&D, arthrotomy , OR cultures with MSSA plus CoNS (CoNS is skin jermaine)  2. Remote PCN allergy: tolerates cephalosporins (and per patient amoxicillin)      PLAN:  - continue ceftriaxone IV  - continue daptomycin IV  - IV abx while in the hospital  - then clindamycin 600mg PO TID through 3/3  - follow up with orthopedics  - ID will sign off. Please call with additional questions or change in clinical status.     Sue Stein MD  [facility]   On-Call: [phone]     ______________________________________________________________________    SUBJECTIVE / INTERVAL HISTORY: continues to feel better. Tolerating antibiotics. Discussed micro.      ROS: All other systems negative except as listed above.    SH/FH/Habits/PMH reviewed and unchanged.    OBJECTIVE:  BP (!) 162/89 (BP Location: Right arm)   Pulse 91   Temp 98.4  F (36.9  C) (Oral)   Resp 17   Ht 1.829 m (6')   Wt 118.8 kg (262 lb)   SpO2 (!) 89%   BMI 35.53 kg/m       Resp: 17      Vital Signs  Temp: 98.5  F (36.9  C)  Temp src: Oral  Resp: 16  Pulse: 87  Pulse Rate Source: Monitor  BP: 138/79  BP Location: Left arm    Temp (24hrs), Av.2  F (36.8  C), Min:97.8  F (36.6  C), Max:98.7  F (37.1  C)      GEN: No acute distress.    RESPIRATORY:  Normal breathing pattern.   EXTREMITIES: thumb wrapped  SKIN/HAIR/NAILS:  No rashes  IV: peripheral IV        Antibiotics:  Ceftriaxone   daptomycin    Pertinent labs:  CRP   Date Value Ref Range Status   2020 0.4 0.0 - 0.8 mg/dL Final      CBC RESULTS:   Recent Labs   Lab Test 24  0650   WBC 7.8   RBC 3.83*   HGB 11.9*   HCT 34.7*   MCV 91   MCH 31.1   MCHC 34.3   RDW 13.3         Last Comprehensive Metabolic  Panel:  Sodium   Date Value Ref Range Status   02/12/2024 139 135 - 145 mmol/L Final     Comment:     Reference intervals for this test were updated on 09/26/2023 to more accurately reflect our healthy population. There may be differences in the flagging of prior results with similar values performed with this method. Interpretation of those prior results can be made in the context of the updated reference intervals.      Potassium   Date Value Ref Range Status   02/12/2024 4.1 3.4 - 5.3 mmol/L Final   02/18/2022 3.7 3.5 - 5.0 mmol/L Final     Chloride   Date Value Ref Range Status   02/12/2024 104 98 - 107 mmol/L Final   02/18/2022 102 98 - 107 mmol/L Final     Carbon Dioxide (CO2)   Date Value Ref Range Status   02/12/2024 26 22 - 29 mmol/L Final   02/18/2022 27 22 - 31 mmol/L Final     Anion Gap   Date Value Ref Range Status   02/12/2024 9 7 - 15 mmol/L Final   02/18/2022 8 5 - 18 mmol/L Final     Glucose   Date Value Ref Range Status   02/18/2022 160 (H) 70 - 125 mg/dL Final     GLUCOSE BY METER POCT   Date Value Ref Range Status   02/15/2024 140 (H) 70 - 99 mg/dL Final     Urea Nitrogen   Date Value Ref Range Status   02/12/2024 11.1 6.0 - 20.0 mg/dL Final   02/18/2022 10 8 - 22 mg/dL Final     Creatinine   Date Value Ref Range Status   02/14/2024 0.79 0.67 - 1.17 mg/dL Final     GFR Estimate   Date Value Ref Range Status   02/14/2024 >90 >60 mL/min/1.73m2 Final   03/05/2021 >60 >60 mL/min/1.73m2 Final     Calcium   Date Value Ref Range Status   02/12/2024 9.1 8.6 - 10.0 mg/dL Final        MICROBIOLOGY DATA:  Personally reviewed.  7-Day Micro Results       Collected Updated Procedure Result Status      02/12/2024 1055 02/14/2024 1416 Anaerobic Bacterial Culture Routine [[id number]]    Synovial fluid from Thumb, Left    Preliminary result Component Value   Culture No anaerobic organisms isolated after 2 days  [P]                02/12/2024 1055 02/12/2024 1438 Gram Stain [[id number]]   Synovial fluid from  Thumb, Left    Final result Component Value   Gram Stain Result No organisms seen   Gram Stain Result 2+ WBC seen            02/12/2024 1055 02/15/2024 0748 Synovial fluid Aerobic Bacterial Culture Routine [[id number]]     (Abnormal)   Synovial fluid from Thumb, Left    Preliminary result Component Value   Culture Culture in progress  [P]     1+ Staphylococcus aureus  [P]     1+ Staphylococcus capitis  [P]         Susceptibility        Staphylococcus aureus      GILBERTO      Ciprofloxacin <=0.5 ug/mL Susceptible  [*]       Clindamycin 0.25 ug/mL Susceptible      Daptomycin 0.5 ug/mL Susceptible      Doxycycline <=0.5 ug/mL Susceptible      Erythromycin <=0.25 ug/mL Susceptible      Gentamicin <=0.5 ug/mL Susceptible      Inducible macrolide resistance test Negative ug/mL Negative  [*]       Levofloxacin <=0.12 ug/mL Susceptible  [*]       Linezolid 2 ug/mL Susceptible  [*]       Moxifloxacin <=0.25 ug/mL Susceptible  [*]       Nitrofurantoin <=16 ug/mL Susceptible  [*]       Oxacillin 0.5 ug/mL Susceptible  [1]       Rifampin <=0.5 ug/mL Susceptible  [*]       Tetracycline >=16 ug/mL Resistant      Tigecycline <=0.12 ug/mL Susceptible  [*]       Trimethoprim/Sulfamethoxazole <=0.5/9.5 ug/mL Susceptible      Vancomycin <=0.5 ug/mL Susceptible                   [*]  Suppressed Antibiotic     [1]  Oxacillin susceptible isolates are susceptible to cephalosporins (example: cefazolin and cephalexin) and beta lactam combination agents. Oxacillin resistant isolates are resistant to these agents.              Susceptibility Comments       Staphylococcus aureus                      02/12/2024 1045 02/14/2024 1404 Anaerobic Bacterial Culture Routine [[id number]]   Abscess from Thumb, Left    Preliminary result Component Value   Culture No anaerobic organisms isolated after 2 days  [P]                02/12/2024 1045 02/12/2024 1456 Gram Stain [[id number]]   Abscess from Thumb, Left    Final result Component Value   Gram Stain  Result No organisms seen   Gram Stain Result 4+ WBC seen   Predominantly PMNs            02/12/2024 1045 02/14/2024 1121 Abscess Aerobic Bacterial Culture Routine [[id number]]   (Abnormal)   Abscess from Thumb, Left    Preliminary result Component Value   Culture Culture in progress  [P]     1+ Staphylococcus aureus  [P]     Susceptibilities done on previous cultures               02/11/2024 2323 02/12/2024 1132 MRSA MSSA PCR, Nasal Swab [[id number]]    Swab from Nares, Bilateral    Final result Component Value   MRSA Target DNA Negative   SA Target DNA Negative                     RADIOLOGY:  Personally Reviewed.  No results found for this or any previous visit (from the past 24 hour(s)).    Principal Problem:    Laceration of left thumb with infection, initial encounter

## 2024-02-19 LAB — BACTERIA ABSC ANAEROBE+AEROBE CULT: NORMAL

## 2024-02-26 LAB — BACTERIA SNV CULT: NORMAL

## 2024-03-22 ENCOUNTER — LAB REQUISITION (OUTPATIENT)
Dept: LAB | Facility: CLINIC | Age: 54
End: 2024-03-22

## 2024-03-22 DIAGNOSIS — E11.42 TYPE 2 DIABETES MELLITUS WITH DIABETIC POLYNEUROPATHY (H): ICD-10-CM

## 2024-03-22 DIAGNOSIS — I10 ESSENTIAL (PRIMARY) HYPERTENSION: ICD-10-CM

## 2024-03-22 DIAGNOSIS — E78.5 HYPERLIPIDEMIA, UNSPECIFIED: ICD-10-CM

## 2024-03-22 PROCEDURE — 80061 LIPID PANEL: CPT | Performed by: STUDENT IN AN ORGANIZED HEALTH CARE EDUCATION/TRAINING PROGRAM

## 2024-03-22 PROCEDURE — 82570 ASSAY OF URINE CREATININE: CPT | Performed by: STUDENT IN AN ORGANIZED HEALTH CARE EDUCATION/TRAINING PROGRAM

## 2024-03-22 PROCEDURE — 80053 COMPREHEN METABOLIC PANEL: CPT | Performed by: STUDENT IN AN ORGANIZED HEALTH CARE EDUCATION/TRAINING PROGRAM

## 2024-03-23 LAB
ALBUMIN SERPL BCG-MCNC: 4.7 G/DL (ref 3.5–5.2)
ALP SERPL-CCNC: 52 U/L (ref 40–150)
ALT SERPL W P-5'-P-CCNC: 22 U/L (ref 0–70)
ANION GAP SERPL CALCULATED.3IONS-SCNC: 11 MMOL/L (ref 7–15)
AST SERPL W P-5'-P-CCNC: 18 U/L (ref 0–45)
BILIRUB SERPL-MCNC: 0.7 MG/DL
BUN SERPL-MCNC: 13.9 MG/DL (ref 6–20)
CALCIUM SERPL-MCNC: 9.4 MG/DL (ref 8.6–10)
CHLORIDE SERPL-SCNC: 100 MMOL/L (ref 98–107)
CHOLEST SERPL-MCNC: 156 MG/DL
CREAT SERPL-MCNC: 0.91 MG/DL (ref 0.67–1.17)
CREAT UR-MCNC: 130 MG/DL
DEPRECATED HCO3 PLAS-SCNC: 27 MMOL/L (ref 22–29)
EGFRCR SERPLBLD CKD-EPI 2021: >90 ML/MIN/1.73M2
FASTING STATUS PATIENT QL REPORTED: ABNORMAL
GLUCOSE SERPL-MCNC: 92 MG/DL (ref 70–99)
HDLC SERPL-MCNC: 49 MG/DL
LDLC SERPL CALC-MCNC: 75 MG/DL
MICROALBUMIN UR-MCNC: 19 MG/L
MICROALBUMIN/CREAT UR: 14.62 MG/G CR (ref 0–17)
NONHDLC SERPL-MCNC: 107 MG/DL
POTASSIUM SERPL-SCNC: 4 MMOL/L (ref 3.4–5.3)
PROT SERPL-MCNC: 6.8 G/DL (ref 6.4–8.3)
SODIUM SERPL-SCNC: 138 MMOL/L (ref 135–145)
TRIGL SERPL-MCNC: 158 MG/DL

## 2024-09-13 ENCOUNTER — LAB REQUISITION (OUTPATIENT)
Dept: LAB | Facility: CLINIC | Age: 54
End: 2024-09-13

## 2024-09-13 DIAGNOSIS — E11.49 TYPE 2 DIABETES MELLITUS WITH OTHER DIABETIC NEUROLOGICAL COMPLICATION (H): ICD-10-CM

## 2024-09-13 LAB
ERYTHROCYTE [DISTWIDTH] IN BLOOD BY AUTOMATED COUNT: 13.5 % (ref 10–15)
HCT VFR BLD AUTO: 39.5 % (ref 40–53)
HGB BLD-MCNC: 13.5 G/DL (ref 13.3–17.7)
MCH RBC QN AUTO: 31.5 PG (ref 26.5–33)
MCHC RBC AUTO-ENTMCNC: 34.2 G/DL (ref 31.5–36.5)
MCV RBC AUTO: 92 FL (ref 78–100)
PLATELET # BLD AUTO: 203 10E3/UL (ref 150–450)
RBC # BLD AUTO: 4.29 10E6/UL (ref 4.4–5.9)
WBC # BLD AUTO: 5.8 10E3/UL (ref 4–11)

## 2024-09-13 PROCEDURE — 80048 BASIC METABOLIC PNL TOTAL CA: CPT | Performed by: PHYSICIAN ASSISTANT

## 2024-09-13 PROCEDURE — 85027 COMPLETE CBC AUTOMATED: CPT | Performed by: PHYSICIAN ASSISTANT

## 2024-09-14 LAB
ANION GAP SERPL CALCULATED.3IONS-SCNC: 13 MMOL/L (ref 7–15)
BUN SERPL-MCNC: 13.4 MG/DL (ref 6–20)
CALCIUM SERPL-MCNC: 9.2 MG/DL (ref 8.8–10.4)
CHLORIDE SERPL-SCNC: 99 MMOL/L (ref 98–107)
CREAT SERPL-MCNC: 0.86 MG/DL (ref 0.67–1.17)
EGFRCR SERPLBLD CKD-EPI 2021: >90 ML/MIN/1.73M2
GLUCOSE SERPL-MCNC: 108 MG/DL (ref 70–99)
HCO3 SERPL-SCNC: 24 MMOL/L (ref 22–29)
POTASSIUM SERPL-SCNC: 3.7 MMOL/L (ref 3.4–5.3)
SODIUM SERPL-SCNC: 136 MMOL/L (ref 135–145)

## (undated) DEVICE — GLOVE BIOGEL PI ULTRATOUCH G SZ 6.5 42165

## (undated) DEVICE — ESU CORD BIPOLAR 12' E0512

## (undated) DEVICE — SYR 10ML LL W/O NDL 302995

## (undated) DEVICE — CUFF TOURN 18IN STRL DISP

## (undated) DEVICE — GLOVE UNDER INDICATOR PI SZ 7.0 LF 41670

## (undated) DEVICE — GOWN LG DISP 9515

## (undated) DEVICE — NDL 27GA 1 1/2" 1188827112

## (undated) DEVICE — PADDING CAST 3IN WEBRIL STRL 2394

## (undated) DEVICE — DRSG XEROFORM 1X8"

## (undated) DEVICE — DRSG GAUZE 4X4" 3033

## (undated) DEVICE — PREP POVIDONE-IODINE 7.5% SCRUB 4OZ BOTTLE MDS093945

## (undated) DEVICE — TRAY PREP DRY SKIN SCRUB 067

## (undated) DEVICE — BNDG ELASTIC 2"X5YDS UNSTERILE 6611-20

## (undated) DEVICE — GLOVE SURG PI ULTRA TOUCH M SZ 7 LF 42670

## (undated) DEVICE — TUBE PENROSE 1/4 X 12 STRL 30414-025

## (undated) DEVICE — ELECTRODE PATIENT RETURN ADULT L10 FT 2 PLATE CORD 0855C

## (undated) DEVICE — SU ETHILON 4-0 P-3 18" BLACK 699G

## (undated) DEVICE — PREP POVIDONE-IODINE 10% SOLUTION 4OZ BOTTLE MDS093944

## (undated) DEVICE — CUSTOM PACK UPPER EXTREMITY SOP5BUEHEC

## (undated) DEVICE — COLLECTION KIT E SWAB REG 220245

## (undated) DEVICE — SOL NACL 0.9% IRRIG 1000ML BOTTLE 2F7124

## (undated) DEVICE — GLOVE UNDER INDICATOR PI SZ 6.5 LF 41665

## (undated) DEVICE — TUBING IRRIG TUR Y TYPE 96" LF 6543-01

## (undated) DEVICE — NEEDLE HYPO MAGELLAN SAFETY 22GA 1 1/2IN 8881850215

## (undated) RX ORDER — ONDANSETRON 2 MG/ML
INJECTION INTRAMUSCULAR; INTRAVENOUS
Status: DISPENSED
Start: 2024-02-12

## (undated) RX ORDER — FENTANYL CITRATE 50 UG/ML
INJECTION, SOLUTION INTRAMUSCULAR; INTRAVENOUS
Status: DISPENSED
Start: 2024-02-12